# Patient Record
Sex: FEMALE | Race: WHITE | Employment: FULL TIME | URBAN - METROPOLITAN AREA
[De-identification: names, ages, dates, MRNs, and addresses within clinical notes are randomized per-mention and may not be internally consistent; named-entity substitution may affect disease eponyms.]

---

## 2017-04-10 ENCOUNTER — TRANSFERRED RECORDS (OUTPATIENT)
Dept: HEALTH INFORMATION MANAGEMENT | Facility: CLINIC | Age: 22
End: 2017-04-10

## 2017-04-10 LAB — EJECTION FRACTION: 81 %

## 2018-06-11 ENCOUNTER — TRANSFERRED RECORDS (OUTPATIENT)
Dept: HEALTH INFORMATION MANAGEMENT | Facility: CLINIC | Age: 23
End: 2018-06-11

## 2018-07-05 ENCOUNTER — TELEPHONE (OUTPATIENT)
Dept: TRANSPLANT | Facility: CLINIC | Age: 23
End: 2018-07-05

## 2018-07-05 NOTE — TELEPHONE ENCOUNTER
"[employer] BREEZE  [id number]      LIVING KIDNEY DONOR EVALUATION  Donor First Name Kinjal Donor MRMIRNA    Donor Last Name Crain Completed 2018 8:47 PM    1995 Record ID [id number]   BREEZE Screen PASSED     Intended Recipient  Recipient First Name April Recipient MRN    Recipient Last Name Salvatore Relationship Met through social media   Recipient  1993 Recipient Diagnosis    Recipient's ABO      Donor Information  Age 22 Gender Female   Ht 165 cm (5' 5'') Race    Wt 73.9 kg (163 lbs) Ethnicity Not /   BMI 27.20 kg/m  Preferred Language English      Required No     Blood Type O   Demographics  Home Address [facility] [address] [phone]   [facility] Type [facility] Alternate #    [facility] Code [address] Type    Country [place] Preferred Contact day Mon, Fri, Thur, Wed, Tue   Email [email or web address] Preferred Contact time 11:00 AM-1:00 PM, 1:00 PM-4:00 PM, 09:00 AM-11:00 AM   Donor's Medical Information  Medical History \"Hysterectomy \"   Allergic Rhinitis   Asthma ( no Hospitalizations, no Intubations, no Steroid Use )   Dysfunctional Uterine Bleeding   GERD   Migraine Headaches   Ovarian Cysts Medications Omeprazole   Topamax   Zyrtec   Surgical History Ovarian Cystectomy   Vaginal Hysterectomy Allergies Indomethacin Extended Release : other (Dizzy )   LATEX : Rash   Penicillin : Wheezing and/or Bronchospasm, Rash   Vicodin : Nausea and/or Vomiting   Social History EtOH: Rare (1-2 drinks/year)   Illicit Drug Use: Denies   Tobacco: Denies Self-Reported Functional Status \"I am able to participate in strenuous sports such as swimming, singles tennis, football, basketball, or skiing\"   Family Medical History Cancer (denies)   Diabetes (denies)   Heart Disease (denies)   Hypertension (denies)   Kidney Disease (denies)   Kidney Stones (denies) Exercise Frequency Exercise (Not on a regular basis)   Review of Organ Systems  Review of Systems " Airway or Lungs: Yes   Blood Disorder: No   Cancer: No   Diabetes,Thyroid,Adrenal,Endocrine Disorder: No   Digestive or Liver: Yes   Female Health: Yes   Heart or Circulatory System: No   Immune Diseases: No   Kidneys and Bladder: No   Muscles,Bones,Joints: No   Neuro: No   Psych: No   Donor's Social Information  Marital Status Domestic Partnership Living Accommodation Lives in rented accommodation   Level of Education Some college education Living Arrangement With spouse   Employment Status Full Time Concerns: health and life insurance No   Employer [employer] Concerns: job security and lost income No   Occupation      Medical Insurance Status Has medical insurance     High Risk Behavior  High Risk Behaviors Blood transfusion < 12 months. (NO)   Commercial sex < 12 months. (NO)   Illicit IV drug use < 5yrs. (NO)   Other high risk sexual contact < 12 months. (NO)   Reason for Donation  Referral Self Researched Reason for Donation I want to be able to give back to people with a deserving chance. I ve seen a out of people in my family hurt with illness and I wasn t able to help. And this is a way for me to give back to a family in need.   Permission to Disclose Inquiry Yes Patient Comments    Donor Motivation Level Highly motivated donor     PCP Contact  PCP Name Enrrique Humphries   PCP [facility]   PCP [facility]   PCP Phone [phone]   Emergency Contact  First Name Rama First Name Leida   Last Name Filipe Last Name Armando   Phone # [phone] Phone # [phone]   Phone Type Mobile Phone Type Mobile   Relationship Spouse Relationship Mother   Office Use  Reviewed By    Reviewed 7/2/2018 8:40 AM   Admin Folder Accept   Comments 7/2 - Passed Eval   Lost for Followup    Extended Comments    BREEZE ID [email or web address]:[id number] survey status completed   Activity History  Call  Task    Due Date 7/3/2018   Last Modified Date/Time 7/3/2018 9:21 AM   Comments    Call  Task     Due Date 7/2/2018   Last Modified Date/Time 7/2/2018 10:19 AM   Comments

## 2018-07-06 DIAGNOSIS — Z00.5 TRANSPLANT DONOR EVALUATION: Primary | ICD-10-CM

## 2018-07-11 ENCOUNTER — DOCUMENTATION ONLY (OUTPATIENT)
Dept: TRANSPLANT | Facility: CLINIC | Age: 23
End: 2018-07-11

## 2018-07-12 ENCOUNTER — TELEPHONE (OUTPATIENT)
Dept: TRANSPLANT | Facility: CLINIC | Age: 23
End: 2018-07-12

## 2018-07-12 NOTE — TELEPHONE ENCOUNTER
Informed Kinjal that her Phase 1's are OK. Average VVJ=570. Verified interest in direct or PEP donation if needed. Had Hyst 6/11/18 for Endometriosis. Has check up with her Dr 7/31.

## 2018-07-13 ENCOUNTER — TELEPHONE (OUTPATIENT)
Dept: TRANSPLANT | Facility: CLINIC | Age: 23
End: 2018-07-13

## 2018-07-13 DIAGNOSIS — Z00.5 TRANSPLANT DONOR EVALUATION: ICD-10-CM

## 2018-07-17 ENCOUNTER — TELEPHONE (OUTPATIENT)
Dept: TRANSPLANT | Facility: CLINIC | Age: 23
End: 2018-07-17

## 2018-07-17 NOTE — TELEPHONE ENCOUNTER
Phone conversation with Kinjal today.  She will be coming to [facility] for a donor evaluation this Friday.  She is requesting travel assistance.  There isn't enough time to apply for NLDAC and she does not want to hold off on coming here for the evaluation.  I explained to her that we could utilize our hospital's rajesh to assist with hotel expenses and did complete rajesh request and reservations were made.  I sent her the following email:    Thanks for taking time to talk with me this morning.  I did have our accommodations department make reservations for Thursday night, 07/19/2012.  They were able to make reservations at [facility],    [facility] [id number].  This is a different hotel that we talked about, but the cost is significantly less and they have a shuttle service that will bring you to the clinic.  You will need to talk with them about how to set it up when you arrive!!  [employer] [address] ([place])  View Map Reservations: [phone]  Safe travels and I will see you when you are here on Friday for the donor evaluation!!    I:  Arrangements made to assist with hotel expenses.  We will need to consider NLDAC if she is approved and coming back for surgery.  Not enough time now to apply for the rajesh but should be considered for any future travel expenses.  P:  I will meet with donor in clinic of Friday to complete donor psychosocial evaluation.  Will remain available to assist with any further psychosocial or JERMAIN needs.

## 2018-07-20 ENCOUNTER — APPOINTMENT (OUTPATIENT)
Dept: TRANSPLANT | Facility: CLINIC | Age: 23
End: 2018-07-20
Attending: TRANSPLANT SURGERY

## 2018-07-20 ENCOUNTER — ALLIED HEALTH/NURSE VISIT (OUTPATIENT)
Dept: TRANSPLANT | Facility: CLINIC | Age: 23
End: 2018-07-20
Attending: TRANSPLANT SURGERY

## 2018-07-20 ENCOUNTER — OFFICE VISIT (OUTPATIENT)
Dept: TRANSPLANT | Facility: CLINIC | Age: 23
End: 2018-07-20
Attending: TRANSPLANT SURGERY

## 2018-07-20 ENCOUNTER — INFUSION THERAPY VISIT (OUTPATIENT)
Dept: INFUSION THERAPY | Facility: CLINIC | Age: 23
End: 2018-07-20
Attending: INTERNAL MEDICINE

## 2018-07-20 ENCOUNTER — OFFICE VISIT (OUTPATIENT)
Dept: NEPHROLOGY | Facility: CLINIC | Age: 23
End: 2018-07-20
Attending: INTERNAL MEDICINE

## 2018-07-20 ENCOUNTER — RADIANT APPOINTMENT (OUTPATIENT)
Dept: GENERAL RADIOLOGY | Facility: CLINIC | Age: 23
End: 2018-07-20
Attending: INTERNAL MEDICINE
Payer: COMMERCIAL

## 2018-07-20 ENCOUNTER — RADIANT APPOINTMENT (OUTPATIENT)
Dept: CT IMAGING | Facility: CLINIC | Age: 23
End: 2018-07-20
Attending: INTERNAL MEDICINE
Payer: COMMERCIAL

## 2018-07-20 ENCOUNTER — APPOINTMENT (OUTPATIENT)
Dept: GENERAL RADIOLOGY | Facility: CLINIC | Age: 23
End: 2018-07-20
Payer: COMMERCIAL

## 2018-07-20 VITALS
HEIGHT: 65 IN | RESPIRATION RATE: 18 BRPM | HEART RATE: 100 BPM | DIASTOLIC BLOOD PRESSURE: 85 MMHG | BODY MASS INDEX: 29.16 KG/M2 | SYSTOLIC BLOOD PRESSURE: 128 MMHG | WEIGHT: 175.04 LBS | OXYGEN SATURATION: 100 % | TEMPERATURE: 97.8 F

## 2018-07-20 VITALS — BODY MASS INDEX: 29.17 KG/M2 | WEIGHT: 175.1 LBS | HEIGHT: 65 IN

## 2018-07-20 DIAGNOSIS — Z00.5 TRANSPLANT DONOR EVALUATION: ICD-10-CM

## 2018-07-20 DIAGNOSIS — Z00.5 TRANSPLANT DONOR EVALUATION: Primary | ICD-10-CM

## 2018-07-20 LAB
ABO + RH BLD: NORMAL
ABO + RH BLD: NORMAL
ALBUMIN SERPL-MCNC: 4.1 G/DL (ref 3.4–5)
ALBUMIN UR-MCNC: NEGATIVE MG/DL
ALP SERPL-CCNC: 164 U/L (ref 40–150)
ALT SERPL W P-5'-P-CCNC: 41 U/L (ref 0–50)
ANION GAP SERPL CALCULATED.3IONS-SCNC: 9 MMOL/L (ref 3–14)
APPEARANCE UR: ABNORMAL
APTT PPP: 27 SEC (ref 22–37)
AST SERPL W P-5'-P-CCNC: 25 U/L (ref 0–45)
BACTERIA #/AREA URNS HPF: ABNORMAL /HPF
BILIRUB SERPL-MCNC: 0.2 MG/DL (ref 0.2–1.3)
BILIRUB UR QL STRIP: NEGATIVE
BLD GP AB SCN SERPL QL: NORMAL
BLOOD BANK CMNT PATIENT-IMP: NORMAL
BUN SERPL-MCNC: 17 MG/DL (ref 7–30)
CALCIUM SERPL-MCNC: 8.8 MG/DL (ref 8.5–10.1)
CHLORIDE SERPL-SCNC: 107 MMOL/L (ref 94–109)
CHOLEST SERPL-MCNC: 143 MG/DL
CMV IGG SERPL QL IA: >8 AI (ref 0–0.8)
CO2 SERPL-SCNC: 23 MMOL/L (ref 20–32)
COLOR UR AUTO: YELLOW
CREAT SERPL-MCNC: 0.75 MG/DL (ref 0.52–1.04)
CREAT UR-MCNC: 130 MG/DL
EBV VCA IGG SER QL IA: 7.9 AI (ref 0–0.8)
EBV VCA IGM SER QL IA: 0.8 AI (ref 0–0.8)
ERYTHROCYTE [DISTWIDTH] IN BLOOD BY AUTOMATED COUNT: 11.9 % (ref 10–15)
GFR SERPL CREATININE-BSD FRML MDRD: >90 ML/MIN/1.7M2
GLUCOSE SERPL-MCNC: 98 MG/DL (ref 70–99)
GLUCOSE UR STRIP-MCNC: NEGATIVE MG/DL
HBA1C MFR BLD: 5.4 % (ref 0–5.6)
HBV CORE AB SERPL QL IA: NONREACTIVE
HBV SURFACE AB SERPL IA-ACNC: 0 M[IU]/ML
HBV SURFACE AG SERPL QL IA: NONREACTIVE
HCT VFR BLD AUTO: 41.3 % (ref 35–47)
HCV AB SERPL QL IA: NONREACTIVE
HDLC SERPL-MCNC: 28 MG/DL
HGB BLD-MCNC: 14 G/DL (ref 11.7–15.7)
HGB UR QL STRIP: NEGATIVE
HIV 1+2 AB+HIV1 P24 AG SERPL QL IA: NONREACTIVE
INR PPP: 0.98 (ref 0.86–1.14)
KETONES UR STRIP-MCNC: NEGATIVE MG/DL
LDLC SERPL CALC-MCNC: 46 MG/DL
LEUKOCYTE ESTERASE UR QL STRIP: ABNORMAL
MCH RBC QN AUTO: 28.7 PG (ref 26.5–33)
MCHC RBC AUTO-ENTMCNC: 33.9 G/DL (ref 31.5–36.5)
MCV RBC AUTO: 85 FL (ref 78–100)
MICROALBUMIN UR-MCNC: 9 MG/L
MICROALBUMIN/CREAT UR: 7.07 MG/G CR (ref 0–25)
MUCOUS THREADS #/AREA URNS LPF: PRESENT /LPF
NITRATE UR QL: NEGATIVE
NONHDLC SERPL-MCNC: 115 MG/DL
PH UR STRIP: 5 PH (ref 5–7)
PHOSPHATE SERPL-MCNC: 4.4 MG/DL (ref 2.5–4.5)
PLATELET # BLD AUTO: 344 10E9/L (ref 150–450)
POTASSIUM SERPL-SCNC: 3.8 MMOL/L (ref 3.4–5.3)
PROT SERPL-MCNC: 7.6 G/DL (ref 6.8–8.8)
PROT UR-MCNC: 0.14 G/L
PROT/CREAT 24H UR: 0.11 G/G CR (ref 0–0.2)
RBC # BLD AUTO: 4.88 10E12/L (ref 3.8–5.2)
RBC #/AREA URNS AUTO: 2 /HPF (ref 0–2)
SODIUM SERPL-SCNC: 138 MMOL/L (ref 133–144)
SOURCE: ABNORMAL
SP GR UR STRIP: 1.02 (ref 1–1.03)
SPECIMEN EXP DATE BLD: NORMAL
SQUAMOUS #/AREA URNS AUTO: 11 /HPF (ref 0–1)
T PALLIDUM AB SER QL: NONREACTIVE
TRIGL SERPL-MCNC: 345 MG/DL
URATE SERPL-MCNC: 6.7 MG/DL (ref 2.6–6)
UROBILINOGEN UR STRIP-MCNC: 0 MG/DL (ref 0–2)
WBC # BLD AUTO: 9.5 10E9/L (ref 4–11)
WBC #/AREA URNS AUTO: 6 /HPF (ref 0–5)

## 2018-07-20 PROCEDURE — 86850 RBC ANTIBODY SCREEN: CPT | Performed by: INTERNAL MEDICINE

## 2018-07-20 PROCEDURE — 84550 ASSAY OF BLOOD/URIC ACID: CPT | Performed by: INTERNAL MEDICINE

## 2018-07-20 PROCEDURE — 86900 BLOOD TYPING SEROLOGIC ABO: CPT | Performed by: INTERNAL MEDICINE

## 2018-07-20 PROCEDURE — 80053 COMPREHEN METABOLIC PANEL: CPT | Performed by: INTERNAL MEDICINE

## 2018-07-20 PROCEDURE — 84100 ASSAY OF PHOSPHORUS: CPT | Performed by: INTERNAL MEDICINE

## 2018-07-20 PROCEDURE — 85730 THROMBOPLASTIN TIME PARTIAL: CPT | Performed by: INTERNAL MEDICINE

## 2018-07-20 PROCEDURE — 40000866 ZZHCL STATISTIC HIV 1/2 ANTIGEN/ANTIBODY PRETRANSPLANT ONLY: Performed by: INTERNAL MEDICINE

## 2018-07-20 PROCEDURE — 86644 CMV ANTIBODY: CPT | Performed by: INTERNAL MEDICINE

## 2018-07-20 PROCEDURE — 86780 TREPONEMA PALLIDUM: CPT | Performed by: INTERNAL MEDICINE

## 2018-07-20 PROCEDURE — 85610 PROTHROMBIN TIME: CPT | Performed by: INTERNAL MEDICINE

## 2018-07-20 PROCEDURE — 80061 LIPID PANEL: CPT | Performed by: INTERNAL MEDICINE

## 2018-07-20 PROCEDURE — 86706 HEP B SURFACE ANTIBODY: CPT | Performed by: INTERNAL MEDICINE

## 2018-07-20 PROCEDURE — 25000128 H RX IP 250 OP 636: Mod: ZF | Performed by: INTERNAL MEDICINE

## 2018-07-20 PROCEDURE — 86480 TB TEST CELL IMMUN MEASURE: CPT | Performed by: INTERNAL MEDICINE

## 2018-07-20 PROCEDURE — 86665 EPSTEIN-BARR CAPSID VCA: CPT | Performed by: INTERNAL MEDICINE

## 2018-07-20 PROCEDURE — 86704 HEP B CORE ANTIBODY TOTAL: CPT | Performed by: INTERNAL MEDICINE

## 2018-07-20 PROCEDURE — 82542 COL CHROMOTOGRAPHY QUAL/QUAN: CPT | Performed by: INTERNAL MEDICINE

## 2018-07-20 PROCEDURE — 81001 URINALYSIS AUTO W/SCOPE: CPT | Performed by: TRANSPLANT SURGERY

## 2018-07-20 PROCEDURE — 84156 ASSAY OF PROTEIN URINE: CPT | Performed by: TRANSPLANT SURGERY

## 2018-07-20 PROCEDURE — 82043 UR ALBUMIN QUANTITATIVE: CPT | Performed by: TRANSPLANT SURGERY

## 2018-07-20 PROCEDURE — 86901 BLOOD TYPING SEROLOGIC RH(D): CPT | Performed by: INTERNAL MEDICINE

## 2018-07-20 PROCEDURE — 85027 COMPLETE CBC AUTOMATED: CPT | Performed by: INTERNAL MEDICINE

## 2018-07-20 PROCEDURE — 86803 HEPATITIS C AB TEST: CPT | Performed by: INTERNAL MEDICINE

## 2018-07-20 PROCEDURE — 87340 HEPATITIS B SURFACE AG IA: CPT | Performed by: INTERNAL MEDICINE

## 2018-07-20 PROCEDURE — 83036 HEMOGLOBIN GLYCOSYLATED A1C: CPT | Performed by: INTERNAL MEDICINE

## 2018-07-20 RX ORDER — TOPIRAMATE 50 MG/1
25 TABLET, FILM COATED ORAL 2 TIMES DAILY
Status: ON HOLD | COMMUNITY
End: 2019-05-22

## 2018-07-20 RX ORDER — CETIRIZINE HYDROCHLORIDE 10 MG/1
10 TABLET ORAL DAILY
COMMUNITY

## 2018-07-20 RX ORDER — IOPAMIDOL 755 MG/ML
100 INJECTION, SOLUTION INTRAVASCULAR ONCE
Status: COMPLETED | OUTPATIENT
Start: 2018-07-20 | End: 2018-07-20

## 2018-07-20 RX ADMIN — IOPAMIDOL 100 ML: 755 INJECTION, SOLUTION INTRAVASCULAR at 13:03

## 2018-07-20 RX ADMIN — IOHEXOL 5 ML: 300 INJECTION, SOLUTION INTRAVENOUS at 08:11

## 2018-07-20 ASSESSMENT — PAIN SCALES - GENERAL: PAINLEVEL: NO PAIN (0)

## 2018-07-20 NOTE — PROGRESS NOTES
Donor Iohexol test    Kinjal Crain presents today to [facility] for a Donor Iohexol test.      Progress note:  ID verified by name and .     The following information was verified with the patient:  Female Patients is there any possibility of being pregnant No  Is there a history of allergy (skin rash, swelling, ect) to:   A.  Iodine (except skin reactions to betadine): No   B. Intravenous radio-contrast agents: No   C. Seafood No    R.N. provided patient with educational handout regarding timed test. Yes    20 gauge PIV placed in Left AC.  Iohexol administered.  Following iohexol administration extension set replaced.  PIV left in place for blood draws and CT this afternoon    Medication administered:  Iohexol (Omnipaque 300mg iodine/ml concentration) 5 mls.    Start time: 815  Stop time: 817    Drug Waste Record    Drug Name: Iohexol  Dose: 5ml  Route administered: IV  NDC #: 0407-1413-10  Amount of waste(mL):5  Reason for waste: Single use vial    Administrations This Visit     iohexol (OMNIPAQUE 300) injection 5 mL     Admin Date Action Dose Route Administered By             2018 Given 5 mL Intravenous Dayna Monteiro RN                              Evaluation nurse in transplant to draw labs at 2 and 4 hours post iohexol administration.  Patient given a slip with the times to get labs drawn and verbalized understanding of the plan.    Patient tolerated the procedure:  Yes    After the infusion patient was discharged to the next appointment.    Dayna Monteiro RN

## 2018-07-20 NOTE — MR AVS SNAPSHOT
After Visit Summary   7/20/2018    Kinjal Crain    MRN: [id number]           Patient Information     Date Of Birth          1995        Visit Information        Provider Department      7/20/2018 10:15 AM Leida Brooks, CYNDY [facility] Solid Organ Transplant        Today's Diagnoses     Transplant donor evaluation    -  1       Follow-ups after your visit        Who to contact     If you have questions or need follow up information about today's clinic visit or your schedule please contact [facility] SOLID ORGAN TRANSPLANT directly at [phone].  Normal or non-critical lab and imaging results will be communicated to you by MeeDochart, letter or phone within 4 business days after the clinic has received the results. If you do not hear from us within 7 days, please contact the clinic through AudioPixelst or phone. If you have a critical or abnormal lab result, we will notify you by phone as soon as possible.  Submit refill requests through Clearwire or call your pharmacy and they will forward the refill request to us. Please allow 3 business days for your refill to be completed.          Additional Information About Your Visit        MyChart Information     Clearwire gives you secure access to your electronic health record. If you see a primary care provider, you can also send messages to your care team and make appointments. If you have questions, please call your primary care clinic.  If you do not have a primary care provider, please call [phone] and they will assist you.        Care EveryWhere ID     This is your Care EveryWhere ID. This could be used by other organizations to access your [facility] medical records  [id number]         Blood Pressure from Last 3 Encounters:   No data found for BP    Weight from Last 3 Encounters:   No data found for Wt              Today, you had the following     No orders found for display       Primary Care Provider Fax #    Physician No Ref-Primary [phone]        No address on file        Equal Access to Services     BILLY JG : Hadii aad shaunna kendall Acuña, wadianeda luqkyler, qaybta kaneydapurvi olivia, waxmedhat aron rahmanafricatony clark. So [facility] [phone].    ATENCIÓN: Si habla español, tiene a medina disposición servicios gratuitos de asistencia lingüística. Llame al [phone].    We comply with applicable federal civil rights laws and [place] laws. We do not discriminate on the basis of race, color, national origin, age, disability, sex, sexual orientation, or gender identity.            Thank you!     Thank you for choosing [facility] SOLID ORGAN TRANSPLANT  for your care. Our goal is always to provide you with excellent care. Hearing back from our patients is one way we can continue to improve our services. Please take a few minutes to complete the written survey that you may receive in the mail after your visit with us. Thank you!             Your Updated Medication List - Protect others around you: Learn how to safely use, store and throw away your medicines at www.disposemymeds.org.          This list is accurate as of 7/20/18 11:59 PM.  Always use your most recent med list.                   Brand Name Dispense Instructions for use Diagnosis    cetirizine 10 MG tablet    zyrTEC     Take 10 mg by mouth daily        omeprazole 20 MG CR capsule    priLOSEC     Take 20 mg by mouth daily        topiramate 50 MG tablet    TOPAMAX     Take 25 mg by mouth 2 times daily

## 2018-07-20 NOTE — MR AVS SNAPSHOT
After Visit Summary   7/20/2018    Kinjal Crain    MRN: [id number]           Patient Information     Date Of Birth          1995        Visit Information        Provider Department      7/20/2018 9:45 AM Carmen Vigil RD [facility] Solid Organ Transplant        Today's Diagnoses     Transplant donor evaluation    -  1       Follow-ups after your visit        Your next 10 appointments already scheduled     Jul 20, 2018  2:00 PM CDT   CTA RENAL WITH CONTRAST with UCCT2   [facility] CT ([facility])    [address] [address] [phone]           Please bring any scans or X-rays taken at other hospitals, if similar tests were done. Also bring a list of your medicines, including vitamins, minerals and over-the-counter drugs. It is safest to leave personal items at home.  Be sure to tell your doctor:   If you have any allergies.   If there s any chance you are pregnant.   If you are breastfeeding.    If you have diabetes as your medication may need to be adjusted for this exam.  You will have contrast for this exam. To prepare:   Do not eat or drink for 2 hours before your exam. If you need to take medicine, you may take it with small sips of water. (We may ask you to take liquid medicine as well.)   The day before your exam, drink extra fluids at least six 8-ounce glasses (unless your doctor tells you to restrict your fluids).  Patients over 70 or patients with diabetes or kidney problems:   If you haven t had a blood test (creatinine test) within the last 30 days, the Cardiologist/Radiologist may require you to get this test prior to your exam.  Please wear loose clothing, such as a sweat suit or jogging clothes. Avoid snaps, zippers and other metal. We may ask you to undress and put on a hospital gown.  If you have any questions, please call the Imaging Department where you will have your exam.              Who to  "contact     If you have questions or need follow up information about today's clinic visit or your schedule please contact [facility] SOLID ORGAN TRANSPLANT directly at [phone].  Normal or non-critical lab and imaging results will be communicated to you by MyChart, letter or phone within 4 business days after the clinic has received the results. If you do not hear from us within 7 days, please contact the clinic through ClickingHousehart or phone. If you have a critical or abnormal lab result, we will notify you by phone as soon as possible.  Submit refill requests through TechDevils or call your pharmacy and they will forward the refill request to us. Please allow 3 business days for your refill to be completed.          Additional Information About Your Visit        TechDevils Information     TechDevils lets you send messages to your doctor, view your test results, renew your prescriptions, schedule appointments and more. To sign up, go to [email or web address] . Click on \"Log in\" on the left side of the screen, which will take you to the Welcome page. Then click on \"Sign up Now\" on the right side of the page.     You will be asked to enter the access code listed below, as well as some personal information. Please follow the directions to create your username and password.     Your access code is: [email or web address]  Expires: 10/14/2018  6:30 AM     Your access code will  in 90 days. If you need help or a new code, please call your [facility] clinic or [phone].        Care EveryWhere ID     This is your Care EveryWhere ID. This could be used by other organizations to access your [facility] medical records  [id number]         Blood Pressure from Last 3 Encounters:   18 128/85    Weight from Last 3 Encounters:   18 79.4 kg (175 lb 0.7 oz)   18 79.4 kg (175 lb 1.6 oz)              Today, you had the following     No orders found for display       Primary Care Provider Fax #    Physician No Ref-Primary " [phone]       No address on file        Equal Access to Services     SHAY JG : Hadii raphael maza kendall Duncanali, sandiepurvi blairdaiha, dinesh alejaneydapurvi lockewaynepurvi, waxmedhat aron kathisidoro rahmanafricatony clark. So [facility] [phone].    ATENCIÓN: Si habla español, tiene a medina disposición servicios gratuitos de asistencia lingüística. Llame al [phone].    We comply with applicable federal civil rights laws and [place] laws. We do not discriminate on the basis of race, color, national origin, age, disability, sex, sexual orientation, or gender identity.            Thank you!     Thank you for choosing [facility] SOLID ORGAN TRANSPLANT  for your care. Our goal is always to provide you with excellent care. Hearing back from our patients is one way we can continue to improve our services. Please take a few minutes to complete the written survey that you may receive in the mail after your visit with us. Thank you!             Your Updated Medication List - Protect others around you: Learn how to safely use, store and throw away your medicines at www.disposemymeds.org.          This list is accurate as of 7/20/18  1:47 PM.  Always use your most recent med list.                   Brand Name Dispense Instructions for use Diagnosis    cetirizine 10 MG tablet    zyrTEC     Take 10 mg by mouth daily        omeprazole 20 MG CR capsule    priLOSEC     Take 20 mg by mouth daily        topiramate 50 MG tablet    TOPAMAX     Take 25 mg by mouth 2 times daily

## 2018-07-20 NOTE — MR AVS SNAPSHOT
After Visit Summary   7/20/2018    Kinjal Crain    MRN: [id number]           Patient Information     Date Of Birth          1995        Visit Information        Provider Department      7/20/2018 8:00 AM UC 49 ATC; UC SPEC INFUSION [facility] [place] Specialty and Procedure        Today's Diagnoses     Transplant donor evaluation    -  1       Follow-ups after your visit        Your next 10 appointments already scheduled     Jul 20, 2018  2:00 PM CDT   CTA RENAL WITH CONTRAST with UCCT2   [facility] [place] [facility] ([facility])    [address] [address] [phone]           Please bring any scans or X-rays taken at other hospitals, if similar tests were done. Also bring a list of your medicines, including vitamins, minerals and over-the-counter drugs. It is safest to leave personal items at home.  Be sure to tell your doctor:   If you have any allergies.   If there s any chance you are pregnant.   If you are breastfeeding.    If you have diabetes as your medication may need to be adjusted for this exam.  You will have contrast for this exam. To prepare:   Do not eat or drink for 2 hours before your exam. If you need to take medicine, you may take it with small sips of water. (We may ask you to take liquid medicine as well.)   The day before your exam, drink extra fluids at least six 8-ounce glasses (unless your doctor tells you to restrict your fluids).  Patients over 70 or patients with diabetes or kidney problems:   If you haven t had a blood test (creatinine test) within the last 30 days, the Cardiologist/Radiologist may require you to get this test prior to your exam.  Please wear loose clothing, such as a sweat suit or jogging clothes. Avoid snaps, zippers and other metal. We may ask you to undress and put on a hospital gown.  If you have any questions, please call the Imaging Department where you will have  "your exam.              Who to contact     If you have questions or need follow up information about today's clinic visit or your schedule please contact [facility] SPECIALTY AND PROCEDURE directly at [phone].  Normal or non-critical lab and imaging results will be communicated to you by MyChart, letter or phone within 4 business days after the clinic has received the results. If you do not hear from us within 7 days, please contact the clinic through MyChart or phone. If you have a critical or abnormal lab result, we will notify you by phone as soon as possible.  Submit refill requests through AzulStar or call your pharmacy and they will forward the refill request to us. Please allow 3 business days for your refill to be completed.          Additional Information About Your Visit        [facility] Center[facility] Media Information     AzulStar lets you send messages to your doctor, view your test results, renew your prescriptions, schedule appointments and more. To sign up, go to [email or web address] . Click on \"Log in\" on the left side of the screen, which will take you to the Welcome page. Then click on \"Sign up Now\" on the right side of the page.     You will be asked to enter the access code listed below, as well as some personal information. Please follow the directions to create your username and password.     Your access code is: [email or web address]  Expires: 10/14/2018  6:30 AM     Your access code will  in 90 days. If you need help or a new code, please call your [facility] clinic or [phone].        Care EveryWhere ID     This is your Care EveryWhere ID. This could be used by other organizations to access your [facility] medical records  [id number]        Your Vitals Were     Height BMI (Body Mass Index)                1.651 m (5' 5\") 29.14 kg/m2           Blood Pressure from Last 3 Encounters:   18 128/85    Weight from Last 3 Encounters:   18 79.4 kg (175 lb 0.7 oz)   18 79.4 kg " (175 lb 1.6 oz)              Today, you had the following     No orders found for display       Primary Care Provider Fax #    Physician No Ref-Primary [phone]       No address on file        Equal Access to Services     SHAY JG : Suzy raphael maza kendall Acuña, sandieda lumay, gibsonta kamatthew olivia, luann hi lafosterisidoro clark. So [facility] [phone].    ATENCIÓN: Si habla español, tiene a medina disposición servicios gratuitos de asistencia lingüística. Llame al [phone].    We comply with applicable federal civil rights laws and [place] laws. We do not discriminate on the basis of race, color, national origin, age, disability, sex, sexual orientation, or gender identity.            Thank you!     Thank you for choosing [facility] SPECIALTY AND PROCEDURE  for your care. Our goal is always to provide you with excellent care. Hearing back from our patients is one way we can continue to improve our services. Please take a few minutes to complete the written survey that you may receive in the mail after your visit with us. Thank you!             Your Updated Medication List - Protect others around you: Learn how to safely use, store and throw away your medicines at www.disposemymeds.org.          This list is accurate as of 7/20/18  1:55 PM.  Always use your most recent med list.                   Brand Name Dispense Instructions for use Diagnosis    cetirizine 10 MG tablet    zyrTEC     Take 10 mg by mouth daily        omeprazole 20 MG CR capsule    priLOSEC     Take 20 mg by mouth daily        topiramate 50 MG tablet    TOPAMAX     Take 25 mg by mouth 2 times daily

## 2018-07-20 NOTE — PROGRESS NOTES
RE: Kinjal Crain  [facility] #[id number]           I saw your patient, Kinjal Crain, in consultation in our pretransplant clinic. She was here at clinic for evaluation as a possible kidney donor to someone she heard about via social media. She presented to clinic today with her significant other.  She had seen our donor video.  Our donor coordinator shared our center-specific results with her.  We discussed:    (1) The risks of donation--including mortality (0.03% risk) and morbidity (approximately 1% risk of major morbidity).  We discussed the major reported causes of death after kidney donation (bleeding, infection, pulmonary embolism).  We discussed the potential complications, including:  bleeding requiring reoperation, infection requiring reoperation, pneumonia, bowel obstruction, infection at the site of the incision, hernia at the site of the incision, deep vein thrombosis, deep vein thrombosis with associated pulmonary embolism, and urinary tract infection.  I told her I could not possibly name all of the risks, but that she was at risk for any complications that could occur in any operation (and that a local library would have books/resources that could provide more detail).       (2) We also discussed the long-term risks of living with one kidney.  We talked in detail about the new publications suggesting slightly increased long-term risk of ESRD after donor nephrectomy.  We discussed the fact that most of the ESRD that has developed after donation has occurred in those donating to a relative; and that it is known that individuals who have a relative with ESRD are at increased risk of ESRD.  For people her age, there is really no data to show what the outcome will be at 50-60 years.  This is an important consideration.  We discussed the rare diseases that might affect having only one kidney.    (3) The hospital stay and the fact that if all goes well, discharge would occur within two to three days after  donor nephrectomy.  We also discussed the fact that there would be no diet or fluid restrictions (again if all went well), and that the only new medication that she would be on would be pain medication.  We discussed the fact that most patients are on Tylenol or something similar within five to six days of surgery.      (4) The recovery time after surgery and the restrictions that are necessary:  a) no driving for a couple of weeks (or until she felt that her reaction would be adequate if she had to slam on the brakes; and b) no lifting over 10 pounds or any exercise that would stretch her abdominal muscles for six weeks (to allow the muscles to heal so that she doesn't develop a hernia).  We also discussed return to work, which could occur in approximately three to four weeks if she had a desk job or would require not returning to work for at least six weeks if the job required any heavy lifting or exercise.  We discussed the potential for not getting her pep and energy back for anywhere from two to six weeks after surgery and how there was a tremendous individual variation in return of full energy level.  I discussed our donor follow-up studies; and that in our surveys, 90% of donors had their energy back by 6 weeks postdonation.    (5) The concern about long distance travel for the first couple of weeks post-donation.  We discussed the risks of deep vein thrombosis associated with plane or car travel.  I recommended that, if flying, she should get up and walk around every 30-40 minutes; if driving she should ask the  to stop the car every 30-45 minutes so Ms. Crain could take a short walk.    (6) The fact that the recipient could be on a waiting list for  donor transplant and would ultimately receive a kidney if Ms. Crain did not donate.      Finally, they were concerned that the potential recipient might have a disease that would recur and destroy the transplant. Our donor coordinator was  able to look at the cause of recipient ESRD; it is not a potentially recurrent disease.    I attempted to answer any remaining questions.  I also told her that should she have any questions, she should feel free to contact us.  We would be glad to answer any questions either over the phone or at another clinic visit.  Her transplant coordinator is Jazmin Madrid and may be reached at [phone].  Thank you for the opportunity to see her.    I spent 40 minutes with this patient.  Over 95% that time was spent in counseling and coordination of care.             Yours truly,               Geovany Russell MD         Professor of Surgery         ([phone])      NICO/

## 2018-07-20 NOTE — MR AVS SNAPSHOT
After Visit Summary   7/20/2018    Kinjal Crain    MRN: [id number]           Patient Information     Date Of Birth          1995        Visit Information        Provider Department      7/20/2018 12:15 PM Jazmin Madrid, RN [facility] Solid Organ Transplant        Today's Diagnoses     Transplant donor evaluation    -  1       Follow-ups after your visit        Your next 10 appointments already scheduled     Jul 20, 2018 11:20 AM CDT   (Arrive by 10:50 AM)   Kidney Donor Evaluation with  Kidney Donor Rachel   [facility] Nephrology ([facility])    [address] [place] [address]  [address]  [facility] [address]   [phone]            Jul 20, 2018 12:15 PM CDT   (Arrive by 12:00 PM)   SOT CARE COORDINATOR RACHEL with Jazmin Madrid RN   [facility] Solid Organ Transplant ([facility])    [address] [place] [address]  [address]  [facility] [address]   [phone]            Jul 20, 2018  1:20 PM CDT   ecg with  CV EKG   [facility] Xray ([facility])    [address]  [address] [facility] [id number] [phone]            Jul 20, 2018  1:45 PM CDT   XR CHEST 2 VIEWS with UCXR1   [facility] Xray ([facility])    [address] [place] [address]  [address] [facility] [id number] [phone]           Please bring a list of your current medicines to your exam. (Include vitamins, minerals and over-thecounter medicines.) Leave your valuables at home.  Tell your doctor if there is a chance you may be pregnant.  You do not need to do anything special for this exam.            Jul 20, 2018  2:00 PM CDT   CTA RENAL WITH CONTRAST with UCCT2   [facility] CT ([facility])    [address]  1st Floor  [facility] [id number] [phone]           Please bring any scans or X-rays taken at other hospitals, if similar tests were  done. Also bring a list of your medicines, including vitamins, minerals and over-the-counter drugs. It is safest to leave personal items at home.  Be sure to tell your doctor:   If you have any allergies.   If there s any chance you are pregnant.   If you are breastfeeding.    If you have diabetes as your medication may need to be adjusted for this exam.  You will have contrast for this exam. To prepare:   Do not eat or drink for 2 hours before your exam. If you need to take medicine, you may take it with small sips of water. (We may ask you to take liquid medicine as well.)   The day before your exam, drink extra fluids at least six 8-ounce glasses (unless your doctor tells you to restrict your fluids).  Patients over 70 or patients with diabetes or kidney problems:   If you haven t had a blood test (creatinine test) within the last 30 days, the Cardiologist/Radiologist may require you to get this test prior to your exam.  Please wear loose clothing, such as a sweat suit or jogging clothes. Avoid snaps, zippers and other metal. We may ask you to undress and put on a hospital gown.  If you have any questions, please call the Imaging Department where you will have your exam.              Who to contact     If you have questions or need follow up information about today's clinic visit or your schedule please contact [facility] SOLID ORGAN TRANSPLANT directly at [phone].  Normal or non-critical lab and imaging results will be communicated to you by MyChart, letter or phone within 4 business days after the clinic has received the results. If you do not hear from us within 7 days, please contact the clinic through Inaikahart or phone. If you have a critical or abnormal lab result, we will notify you by phone as soon as possible.  Submit refill requests through Snocap or call your pharmacy and they will forward the refill request to us. Please allow 3 business days for your refill to be completed.          Additional  "Information About Your Visit        MyChart Information     [facility] lets you send messages to your doctor, view your test results, renew your prescriptions, schedule appointments and more. To sign up, go to [email or web address] . Click on \"Log in\" on the left side of the screen, which will take you to the Welcome page. Then click on \"Sign up Now\" on the right side of the page.     You will be asked to enter the access code listed below, as well as some personal information. Please follow the directions to create your username and password.     Your access code is: [email or web address]  Expires: 10/14/2018  6:30 AM     Your access code will  in 90 days. If you need help or a new code, please call your [place] clinic or [phone].        Care EveryWhere ID     This is your Care EveryWhere ID. This could be used by other organizations to access your [place] medical records  [id number]         Blood Pressure from Last 3 Encounters:   18 116/77    Weight from Last 3 Encounters:   18 79.4 kg (175 lb 0.7 oz)   18 79.4 kg (175 lb 1.6 oz)              Today, you had the following     No orders found for display       Primary Care Provider Fax #    Physician No Ref-Primary [phone]       No address on file        Equal Access to Services     [facility] : Hadii raphael claudioo Domenico, waaxda luqadaha, qaybta kaalmada ne, luann anderson . So [facility] [phone].    ATENCIÓN: Si habla español, tiene a medina disposición servicios gratuitos de asistencia lingüística. Llame al [phone].    We comply with applicable federal civil rights laws and [place] laws. We do not discriminate on the basis of race, color, national origin, age, disability, sex, sexual orientation, or gender identity.            Thank you!     Thank you for choosing [facility] SOLID ORGAN TRANSPLANT  for your care. Our goal is always to provide you with excellent care. Hearing back from our patients " is one way we can continue to improve our services. Please take a few minutes to complete the written survey that you may receive in the mail after your visit with us. Thank you!             Your Updated Medication List - Protect others around you: Learn how to safely use, store and throw away your medicines at www.disposemymeds.org.          This list is accurate as of 7/20/18 10:50 AM.  Always use your most recent med list.                   Brand Name Dispense Instructions for use Diagnosis    cetirizine 10 MG tablet    zyrTEC     Take 10 mg by mouth daily        omeprazole 20 MG CR capsule    priLOSEC     Take 20 mg by mouth daily        topiramate 50 MG tablet    TOPAMAX     Take 25 mg by mouth 2 times daily

## 2018-07-20 NOTE — NURSING NOTE
Saw Kinjal in clinic [facility] donor evaluation.    -Came with significant other, Clarice cuellar Has offered to be donor to friend  -Discussed surgery hospitalization and recovery.  Know when and how to obtain evaluation results and the process for scheduling surgery.  Reviewed SRTR datasheet.  Signed consent for donor evaluation.  Donor Signed MIREILLE.  Requested routine cancer screening tests:    PAP  Questions answered.  Approx. time spent:  30 minutes  Donor has medical insurance:  Yes      Living Kidney Donor Consent per OPTN Policy 14.3 for Transplant Coordinators    Written assurance has been obtained from the patient that the donor:   1) Is willing to donate  2) Is free from inducement and coercion  3) Has been informed that the donor may decline to donate at any time  4) Has been informed that transplant centers must:     A) Offer donors an opportunity to discontinue the donor consent or evaluation process in a way that is protected and confidential    B) Provide an independent donor advocate (MARY ALICE) to assist the potential donor during this process    The following was presented in a language in which donor is able to engage in meaningful dialogue and was reviewed and discussed with the patient by the transplant coordinator and the physician.     The following has been provided:   Education and instruction about all phases of the living donation process includin) Consent  2) The donor must undergo medical and psychosocial evaluations  3) Disclosure that the recovery hospital will take all reasonable precautions to provide confidentiality for the donor/recipient  4) Disclosure that it is a federal crime for any person to knowingly acquire, obtain or otherwise transfer any human organ for valuable consideration  5) The transplant hospital may refuse the potential donor, and the donor could be evaluated by another transplant program with different selection criteria  6) Data from the most recent SRTR  center-specific reports:     A) National 1-year patient and graft survival rates    B) Hospital s 1-year patient and graft survival rates    C) Notification about all CMS outcome requirements not being met by the recovery and recipient s hospitals    The following has been provided:   1) Education about expected post-donation kidney function and how chronic kidney disease and end-stage renal disease might potentially impact the donor in the future to include:    A) On average, donors will have 25-35% permanent loss of kidney function at donation    B) Baseline risk of End Stage Renal Disease (ESRD)  does not exceed that of members of general population with same demographic profile    C) Donor risks must be interpreted in light of known epidemiology of both Chronic Kidney Disease (CKD) or ESRD    D) CKD generally develops in midlife (40-50 years old)    E) ESRD generally develops after age 60    F) Medical evaluation of young potential donor cannot predict lifetime risk  2) Donors may be at higher risk for CKD if they sustain damage to the remaining kidney. 3) Development of CKD and progression to ESRD may be more rapid with only 1 kidney  4) Dialysis is required when reaching ESRD  5) Current practice prioritizes prior living kidney donors who became kidney transplant candidates  6) Alternate procedures or courses of treatment for the recipient, including  donor transplant    A) A  donor kidney may become available for the recipient before donor evaluation is complete or transplant occurs    B) Any transplant candidate may have risk factors for increased morbidity or mortality that are not disclosed to the potential donor  7) The patient will receive a thorough medical and psychosocial evaluation  8) Health information obtained during the evaluation is subject to the same regulations as all records and could reveal conditions that must be reported to local, state, or federal public health  authorities  9) The [facility], [place], is required to report living donor follow up information at 6, 12, and 24 months  10) Potential donors must commit to post operative follow up testing coordinated by the [facility]  11) Any infectious disease or malignancy pertinent to acute recipient care discovered during the potential donor s first two years of follow up care:    A) Will be disclosed to the donor    B) May need to be reported to local, state or federal public health authorities    C) Will be disclosed to their recipient s transplant center, and    D) Will be reported through the OPTN Improving Patient Safety Portal    Disclosure has been provided that these risks may be transient or permanent & include but are not limited to potential medical or surgical risks:    Death    Scars, pain, fatigue, and other consequences typical of any surgical procedure    Decreased kidney function    Abdominal or bowel symptoms such as bloating and nausea, and developing bowel obstruction    Kidney failure and the need for dialysis or kidney transplant for the donor  Impact of obesity, hypertension or other donor-specific medical conditions on morbidity and mortality of the potential donor    DAMIÁN Viera, RN     Transplant Coordinator  Qwirsl-[phone]  [phone]
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
## 2018-07-20 NOTE — PROGRESS NOTES
Outpatient MNT: Kidney Donor Evaluation    Current BMI: 29.1 (HT 5 ft 5 in,  lbs/79.4 kg)  BMI is within criteria of <30 for kidney donation    8 Year Risk of Diabetes  Pt received points for the following criteria: Triglycerides >150; BMI 25-29.9; HDL <40  Total points: 10  8-Year risk of T2DM: <3%     Time Spent: 15 minutes  Visit Type: Initial  Referring Physician: Dr. Russell  Pt accompanied by: Significant other    Nutrition Assessment  Pt had hysterectomy June 11, 2018 and is still recovering from this but has changed her diet to include more healthy items post-op including vegetables and more fruits. She still does eat a diet high in added sugars and starch (rice alone for lunch, sugary cereal, lemonade daily).     Vitamins, Supplements, Pertinent Meds: MVI  Herbal Medicines/Supplements: Echinacea, tumeric/cumin powder pill for headaches     Diet Recall  Breakfast Cereal (Prydeinig toast crunch) w/2% milk and fruit   Lunch [place] (baby swiss) or rice   Dinner Homemade stir kunz with rice and vegetables   Snacks Rarely   Beverages Water, lemonade (unsure if SF or not), propel   Alcohol None - very rarely   Dining out 1x/month     Physical Activity  None since surgery in June - used to walk - plans to get back into this     Anthropometrics  Height:   5 ft 5 in   BMI:    29    Weight Status:Overweight BMI 25-29.9   Weight:  175 lbs            IBW (lb): 56.8 kg  % IBW: 140%    Wt Hx:   Wt Readings from Last 10 Encounters:   07/20/18 79.4 kg (175 lb 1.6 oz)     Adj/dosing BW: 137 lbs/62 kg       Labs  Recent Labs   Lab Test  07/20/18   0735   CHOL  143   HDL  28*   LDL  46   TRIG  345*   **Pt mentions that she did eat dinner late last nightr (~9pm) and she was supposed to have fasted for 12 hrs prior to labs (7pm-7am). Unsure if this would have a significant effect on TG labs. Pt had rice and vegetable stir kunz for dinner .    FBG = 98 (WNL)  A1C = still processing    Prediction of Incident Diabetes Mellitus in  Middle-aged Adults: The [place] Offspring Study  Carloz Portillo MD; James B. Meigs, MD, MPH; Geraldine Guzman, PhD; Mya Nichols MD, MPH; Robi Cortez MD; Leo Martinez Sr,   PhD  Pt's estimated risk for T2DM (per Table 6 above)  Pt received points for the following criteria: Triglycerides >150; BMI 25-29.9; HDL <40  Total points: 10  8-Year risk of T2DM: <3%    Estimated Nutrition Needs  Energy  5588-5182 kcal/day    (20-25 kcal/kg for desired weight loss)      Protein  50-62 g PRO/day    (0.8-1 g/kg for maintenance)           Fluid  1 ml/kcal or per MD     Nutrition Diagnosis  Obesity r/t excessive energy intake and inadequate physical activity AEB BMI 29.1.     Food and nutrition related knowledge deficit r/t pre transplant donor eval pt AEB pt verbalized not hearing pre/post transplant diet guidelines.    Nutrition Intervention  Nutrition education provided:  1. Discussed strategies for wt loss (portion sizes, increased activity, lower fat and lower sugar options for cereal). Also discussed strategies for lowering triglycerides and provided handout: Tips for Lowering Triglycerides. Discussed trying to choose cereal higher in fiber and lower sugar and also addition of a protein at lunch with the rice or sandwich such as beans, hard boiled egg, nuts, or canned tuna in water. Also provided pt with handout on Eating Well on a Budget to help pt choose healthy non-perishable items as well as fruits and vegetables in season that are budget-friendly.   2. Reviewed overall healthy diet guidelines for pre and post kidney donation. Discussed maintenance of a healthy weight, Na+ intake <3000 mg/day, and avoidance of herbal supplements post donation d/t unknown effects on the kidney.  3. Discussed herbal supplements pt is currently taking and advised pt to consult with MD about whether these are safe to consume after donation.     Patient Understanding: Pt verbalized understanding of education  provided.  Expected Compliance: Good  Follow-Up Plans: PRN     Nutrition Goals  Pt to verbalize understanding of education provided     Provided pt with contact info.   Myla Bob MS, RD, LD  Pgr [phone]

## 2018-07-20 NOTE — LETTER
7/20/2018       RE: Kinjal Crain  [address]  [facility] [address]     Dear Colleague,    Thank you for referring your patient, Kinjal Crain, to the [facility] ORGAN TRANSPLANT at [facility]. Please see a copy of my visit note below.    RE: Kinjal Crain  [facility] #[id number]           I saw your patient, Kinjal Crain, in consultation in our pretransplant clinic. She was here at clinic for evaluation as a possible kidney donor to someone she heard about via social media. She presented to clinic today with her significant other.  She had seen our donor video.  Our donor coordinator shared our center-specific results with her.  We discussed:    (1) The risks of donation--including mortality (0.03% risk) and morbidity (approximately 1% risk of major morbidity).  We discussed the major reported causes of death after kidney donation (bleeding, infection, pulmonary embolism).  We discussed the potential complications, including:  bleeding requiring reoperation, infection requiring reoperation, pneumonia, bowel obstruction, infection at the site of the incision, hernia at the site of the incision, deep vein thrombosis, deep vein thrombosis with associated pulmonary embolism, and urinary tract infection.  I told her I could not possibly name all of the risks, but that she was at risk for any complications that could occur in any operation (and that a local library would have books/resources that could provide more detail).       (2) We also discussed the long-term risks of living with one kidney.  We talked in detail about the new publications suggesting slightly increased long-term risk of ESRD after donor nephrectomy.  We discussed the fact that most of the ESRD that has developed after donation has occurred in those donating to a relative; and that it is known that individuals who have a relative with ESRD are at increased risk of ESRD.  For people her age, there is  really no data to show what the outcome will be at 50-60 years.  This is an important consideration.  We discussed the rare diseases that might affect having only one kidney.    (3) The hospital stay and the fact that if all goes well, discharge would occur within two to three days after donor nephrectomy.  We also discussed the fact that there would be no diet or fluid restrictions (again if all went well), and that the only new medication that she would be on would be pain medication.  We discussed the fact that most patients are on Tylenol or something similar within five to six days of surgery.      (4) The recovery time after surgery and the restrictions that are necessary:  a) no driving for a couple of weeks (or until she felt that her reaction would be adequate if she had to slam on the brakes; and b) no lifting over 10 pounds or any exercise that would stretch her abdominal muscles for six weeks (to allow the muscles to heal so that she doesn't develop a hernia).  We also discussed return to work, which could occur in approximately three to four weeks if she had a desk job or would require not returning to work for at least six weeks if the job required any heavy lifting or exercise.  We discussed the potential for not getting her pep and energy back for anywhere from two to six weeks after surgery and how there was a tremendous individual variation in return of full energy level.  I discussed our donor follow-up studies; and that in our surveys, 90% of donors had their energy back by 6 weeks postdonation.    (5) The concern about long distance travel for the first couple of weeks post-donation.  We discussed the risks of deep vein thrombosis associated with plane or car travel.  I recommended that, if flying, she should get up and walk around every 30-40 minutes; if driving she should ask the  to stop the car every 30-45 minutes so Ms. Crain could take a short walk.    (6) The fact that the  recipient could be on a waiting list for  donor transplant and would ultimately receive a kidney if Ms. Crain did not donate.      Finally, they were concerned that the potential recipient might have a disease that would recur and destroy the transplant. Our donor coordinator was able to look at the cause of recipient ESRD; it is not a potentially recurrent disease.    I attempted to answer any remaining questions.  I also told her that should she have any questions, she should feel free to contact us.  We would be glad to answer any questions either over the phone or at another clinic visit.  Her transplant coordinator is Jazmin Madrid and may be reached at [phone].  Thank you for the opportunity to see her.    I spent 40 minutes with this patient.  Over 95% that time was spent in counseling and coordination of care.             Yours truly,               Geovany Russell MD         Professor of Surgery         ([phone])      AJM/st    Again, thank you for allowing me to participate in the care of your patient.      Sincerely,    Geovany Russell MD

## 2018-07-20 NOTE — MR AVS SNAPSHOT
After Visit Summary   7/20/2018    Kinjal Crain    MRN: [id number]           Patient Information     Date Of Birth          1995        Visit Information        Provider Department      7/20/2018 10:00 AM Geovany Russell MD [facility] Solid Organ Transplant        Today's Diagnoses     Transplant donor evaluation    -  1       Follow-ups after your visit        Your next 10 appointments already scheduled     Jul 20, 2018  1:20 PM CDT   ecg with UC CV EKG   [facility] Xray ([facility])    [address] [place] [id number] [phone]            Jul 20, 2018  1:45 PM CDT   XR CHEST 2 VIEWS with UCXR1   [facility] ([facility])    [address] [place] [id number] [phone]           Please bring a list of your current medicines to your exam. (Include vitamins, minerals and over-thecounter medicines.) Leave your valuables at home.  Tell your doctor if there is a chance you may be pregnant.  You do not need to do anything special for this exam.            Jul 20, 2018  2:00 PM CDT   CTA RENAL WITH CONTRAST with UCCT2   [facility] CT ([facility])    [address] [id number] [phone]           Please bring any scans or X-rays taken at other hospitals, if similar tests were done. Also bring a list of your medicines, including vitamins, minerals and over-the-counter drugs. It is safest to leave personal items at home.  Be sure to tell your doctor:   If you have any allergies.   If there s any chance you are pregnant.   If you are breastfeeding.    If you have diabetes as your medication may need to be adjusted for this exam.  You will have contrast for this exam. To prepare:   Do not eat or drink for 2 hours before your exam. If you need to take medicine, you may take it with small sips of  "water. (We may ask you to take liquid medicine as well.)   The day before your exam, drink extra fluids at least six 8-ounce glasses (unless your doctor tells you to restrict your fluids).  Patients over 70 or patients with diabetes or kidney problems:   If you haven t had a blood test (creatinine test) within the last 30 days, the Cardiologist/Radiologist may require you to get this test prior to your exam.  Please wear loose clothing, such as a sweat suit or jogging clothes. Avoid snaps, zippers and other metal. We may ask you to undress and put on a hospital gown.  If you have any questions, please call the Imaging Department where you will have your exam.              Who to contact     If you have questions or need follow up information about today's clinic visit or your schedule please contact [facility] SOL[facility] ORGAN TRANSPLANT directly at [phone].  Normal or non-critical lab and imaging results will be communicated to you by com[employer], letter or phone within 4 business days after the clinic has received the results. If you do not hear from us within 7 days, please contact the clinic through [employer] or phone. If you have a critical or abnormal lab result, we will notify you by phone as soon as possible.  Submit refill requests through [employer] or call your pharmacy and they will forward the refill request to us. Please allow 3 business days for your refill to be completed.          Additional Information About Your Visit        [employer] Information     [employer] lets you send messages to your doctor, view your test results, renew your prescriptions, schedule appointments and more. To sign up, go to [email or web address] . Click on \"Log in\" on the left side of the screen, which will take you to the Welcome page. Then click on \"Sign up Now\" on the right side of the page.     You will be asked to enter the access code listed below, as well as some personal information. Please follow the directions to create your " username and password.     Your access code is: [email or web address]  Expires: 10/14/2018  6:30 AM     Your access code will  in 90 days. If you need help or a new code, please call your [facility] or [phone].        Care EveryWhere ID     This is your Care EveryWhere ID. This could be used by other organizations to access your [facility] medical records  [id number]         Blood Pressure from Last 3 Encounters:   18 128/85    Weight from Last 3 Encounters:   18 79.4 kg (175 lb 0.7 oz)   18 79.4 kg (175 lb 1.6 oz)              Today, you had the following     No orders found for display       Primary Care Provider Fax #    Physician No Ref-Primary [phone]       No address on file        Equal Access to Services     [facility] Center[facility] : Hadii raphael Acuña, wapamela arce, dinesh kaalmapurvi olivia, luann anderson . So [facility] [phone].    ATENCIÓN: Si habla español, tiene a medina disposición servicios gratuitos de asistencia lingüística. Arnulfo al [phone].    We comply with applicable federal civil rights laws and [place] laws. We do not discriminate on the basis of race, color, national origin, age, disability, sex, sexual orientation, or gender identity.            Thank you!     Thank you for choosing [facility]  for your care. Our goal is always to provide you with excellent care. Hearing back from our patients is one way we can continue to improve our services. Please take a few minutes to complete the written survey that you may receive in the mail after your visit with us. Thank you!             Your Updated Medication List - Protect others around you: Learn how to safely use, store and throw away your medicines at www.disposemymeds.org.          This list is accurate as of 18  1:11 PM.  Always use your most recent med list.                   Brand Name Dispense Instructions for use Diagnosis    cetirizine 10 MG tablet     zyrTEC     Take 10 mg by mouth daily        omeprazole 20 MG CR capsule    priLOSEC     Take 20 mg by mouth daily        topiramate 50 MG tablet    TOPAMAX     Take 25 mg by mouth 2 times daily

## 2018-07-20 NOTE — LETTER
7/20/2018      RE: Kinjal Breann  [address]  [facility] [address]       Assessment and Plan:  1. Prospective kidney transplant donor with some issues that needs to be addressed prior to donation. Patient's blood pressure is acceptable at this visit, kidney function appears to be acceptable with Iohexol pending, and urinalysis is bland.  1.  Recent weight gain following her hysterectomy with biochemical profile suggestive of impaired metabolism.  We discussed the need to lose the weight as this is likely distant to happen again after her donor nephrectomy unless she continues to watch her diet following donation and be able to engage in light exercises as early as possible.  She verbalized her understanding and the prudence of losing the weight now prior to undergoing another surgery and stacking on the weight of 2 surgeries.  2.  History of tachycardia induced by anesthesia: Patient underwent formal evaluation by cardiology with an echo and stress test would like to obtain these records for review.  3.  Long-standing GERD for which she is on PPI for the last 6 years which she has not been able to get off off.  On this note it is important to make sure there is not any ongoing pathology with the stomach I suggested that she returns to her PCP and potentially obtain an EGD.  Owing to the increasing body of literature that PPI may be linked to CKD, it may be appropriate to use an H2 blocker to see if that helps her symptoms.  4.  Dysfunctional uterine bleeding status post hysterectomy she is healed well but had gained 13 pounds in the last 6 weeks.  5.  Mild hyperuricemia which may be a sign of impaired metabolism would like to repeat after she had lost her weight and return to her usual diet.  6.  Mildly elevated alk phos no signs or symptoms of liver disease or gallbladder discomfort at this point will just repeat once she had lost the weight with her other labs that need to be collected at that time including a  UA    Discussed the risks of donating a kidney, including the surgical risk and the possible risks of living with one kidney.    Education about expected post-donation kidney function and how chronic kidney disease (CKD) and end stage kidney disease (ESKD) might potentially impact the donor in the future, include, but not limited to:       - On average, donors will have 25-35% permanent loss of kidney function at donation.       - Baseline risk of ESKD may slightly exceed that of members of the general population with the same demographic profile.       - Donor risks must be interpreted in light of known epidemiology of both CKD or ESKD, such as that CKD generally develops in midlife (40-50 years old) and ESKD generally develops after age 60.       - Medical evaluation of young potential donors cannot predict lifetime risk of CKD or ESKD.       - Donors may be at higher risk for CKD if they sustain damage to the remaining kidney.       - Development of CKD and progression of ESKD may be more rapid with only 1 kidney.       - Some type of kidney replacement therapy, either kidney transplant or dialysis, is required when reaching ESKD.    Potential medical or surgical risks include, but not limited to:       - Death.       - Scars, pain, fatigue, and other consequences typical of any surgical procedure.       - Decreased kidney function.       - Abdominal or bowel symptoms, such as bloating and nausea, and developing bowel obstruction.       - Kidney failure (ESKD) and the need for a kidney transplant or dialysis for the donor.       - Impact of obesity, hypertension, or other donor-specific medical conditions on morbidity and mortality of the potential donor.    Patients overall evaluation will be discussed with the transplant team and a final recommendation on the patients' suitability to be a kidney transplant donor will be made at that time.    Consult:  Kinjal Crain was seen in consultation at the request of  Dr. Geovany Russell for evaluation as a potential kidney transplant donor.    Reason for Visit:  Kinjal Crain is a 22 year old female who presents for a kidney donor evaluation.  Patient would like to donate to a person in need identified through social.    HPI:      Kinjal Crain is a 23-year-old lady who presents today accompanied by her partner to complete her donor evaluation.  She is interested in donating a kidney to a friend that she learned about their need through Facebook.  She thought about the matter and she looked different websites and she decided to proceed with the evaluation.  Kinjal is generally healthy with few chronic conditions such as GERD for which she is on PPI for the last 6 years and history of dysfunctional uterine bleeding for which she underwent total hysterectomy and now she is healing well.  In the last 6 weeks as she recovered from her surgery she has not been watching her diet and she noted 13 pounds of weight gain.  She is now feeling well enough to pursue her routine dietary modification and exercises.  She had history of tachycardia that was first noted during anesthesia episode for which she underwent formal assessment by cardiology and reports that she had an echo and stress test.  She specifically denied any fainting passing out, or family history of sudden cardiac death.  She reports that she has good social support through her domestic partner who she had been with for the last 10 years.         Kidney Disease Hx:       h/o Kidney Problems: No  Family h/o Genetic Kidney Disease: No       h/o HTN: No    Usual BP: 120/80       h/o Protein in Urine: No  h/o Blood in Urine: No       h/o Kidney Stones: No  h/o Kidney Injury: No       h/o Recurrent UTI: had couple of UTIs 1 year apart   h/o Genitourinary Problems: No       h/o Chronic NSAID Use: No         Other Medical Hx:       h/o DM: No        h/o Preeclampsia: Not applicable          h/o Gastrointestinal, Pancreas or  Liver Problems: No       h/o Lung or Heart Problems: Yes: Hx of tachycardia        h/o Hematologic Problems: No  h/o Bleeding or Clotting Problems: No       h/o Cancer: No       h/o Infection Problems: No         Skin Cancer Risk:       h/o more than 50 moles: No       h/o extensive sun exposure: No       h/o melanoma: No       Family h/o melanoma: No         Mental Health Assessment:       h/o Depression: No       h/o Psychiatric Illness: No       h/o Suicidal Attempt(s): No    ROS:   A comprehensive review of systems was obtained and negative, except as noted in the HPI or PMH.    PMH:   History was taken from the patient as noted below.  Past Medical History:   Diagnosis Date     DUB (dysfunctional uterine bleeding)     s/p hysterectomy      GERD (gastroesophageal reflux disease)      Migraine     botox     Tachycardia        PSH:   Past Surgical History:   Procedure Laterality Date     HYSTERECTOMY       TONSILLECTOMY       Personal or family history of anesthesia problems: nausea and tachycardia    Family Hx:   Family History   Problem Relation Age of Onset     No Known Problems Mother      No Known Problems Father      No Known Problems Sister      No Known Problems Brother           Specific Family Hx:       FH of DM: No       FH of CAD: No  FH of HTN: No       FH of Cancer: Yes   FH of Kidney Cancer: No    Personal Hx:   Social History     Social History     Marital status: Single     Spouse name: N/A     Number of children: N/A     Years of education: N/A     Occupational History     Not on file.     Social History Main Topics     Smoking status: Never Smoker     Smokeless tobacco: Never Used     Alcohol use Yes      Comment: Occasional- 1-2x per year     Drug use: No     Sexual activity: Not on file     Other Topics Concern     Not on file     Social History Narrative     No narrative on file          Specific Social Hx:        Health Insurance Status: Yes       Employment Status: Full time  Occupation:  "Customer service                    Living Arrangements: lives with an adult        Social Support: Yes       Presence of increased risk for disease transmission behaviors as defined by PHS guidelines: No        Allergies:  Allergies   Allergen Reactions     Indocin [Indomethacin]      Dizziness       Norco [Hydrocodone-Acetaminophen] Nausea and Vomiting     Penicillins Hives     Allergy when an infant     Latex Rash     Sumatriptan Palpitations       Medications:  Prior to Admission medications    Medication Sig Start Date End Date Taking? Authorizing Provider   cetirizine (ZYRTEC) 10 MG tablet Take 10 mg by mouth daily   Yes Reported, Patient   omeprazole (PRILOSEC) 20 MG CR capsule Take 20 mg by mouth daily 6/22/18  Yes Reported, Patient   topiramate (TOPAMAX) 50 MG tablet Take 25 mg by mouth 2 times daily   Yes Reported, Patient       Vitals:  Vital Signs 7/20/2018 7/20/2018 7/20/2018   Systolic - - 116   Diastolic - - 77   Pulse - - 93   Temperature - - 97.8   Respirations - - 18   Weight (LB) - 175 lb 1.6 oz 175 lb 0.7 oz   Height - 5' 5\" 5' 5\"   BMI (Calculated) - 29.2 29.19   Pain 0 - -   O2 - - 100       Exam:   GENERAL APPEARANCE: alert and no distress  HENT: mouth without ulcers or lesions  LYMPHATICS: no cervical or supraclavicular nodes  RESP: lungs clear to auscultation - no rales, rhonchi or wheezes  CV: regular rhythm, normal rate, no rub, no murmur  EDEMA: no LE edema bilaterally  ABDOMEN: soft, nondistended, nontender, bowel sounds normal  MS: extremities normal - no gross deformities noted, no evidence of inflammation in joints, no muscle tenderness  SKIN: no rash    Results:   Labs and imaging were ordered for this visit and reviewed by me.  Recent Results (from the past 336 hour(s))   Hemoglobin    Collection Time: 07/10/18 11:42 AM   Result Value Ref Range    Hemoglobin (External) 14.1 11.8 - 15.1 g/dL   Creatinine    Collection Time: 07/10/18 11:42 AM   Result Value Ref Range    " Creatinine (External) 0.68 0.60 - 1.10 mg/dL    GFR Estimated (External) 124 mls/min/1.73m2   Glucose    Collection Time: 07/10/18 11:42 AM   Result Value Ref Range    Glucose (External) 95 70 - 99 mg/dL   UA with Microscopic    Collection Time: 07/10/18 11:43 AM   Result Value Ref Range    WBC Urine (External) 3 <=5 /HPF    RBC Urine (External) 0 <=2 /HPF    Squamous Epithelial Urine (External) Occasional     Bacteria Urine (External) <10 <10 /HPF    Crystal Urine (External) Moderate     Color Urine (External) YEL Yellow    Appearance Urine (External) Slightyly cloudy Clear to Slightyly cloudy    Leukocyte Esterase Urine (External) Trace (A) Negative    Nitrites Urine (External) NEG Negative    Blood Urine (External) NEG Negative    Specific Gravity Urine (External) 1.015 1.005 - 1.025    Ketones Urine (External) NEG Negative    Bilirubin Urine (External) NEG Negative    Glucose Urine (External) NEG Negative    Protein Albumin Ur (External) Negative Negative    pH Urine (External) 7.0 5.0 - 8.0    Source Urine (External) Clean catch    Albumin Random Urine Quantitative with Creat Ratio    Collection Time: 07/10/18 11:43 AM   Result Value Ref Range    Microalbumin Urine mg/g Cr (External) Unable to calculate    ABO type    Collection Time: 07/10/18 11:48 AM   Result Value Ref Range    ABO (External) O     Rh (External) Pos    Protein  random urine with Creat Ratio    Collection Time: 07/20/18  7:23 AM   Result Value Ref Range    Protein Random Urine 0.14 g/L    Protein Total Urine g/gr Creatinine 0.11 0 - 0.2 g/g Cr   Routine UA with microscopic    Collection Time: 07/20/18  7:23 AM   Result Value Ref Range    Color Urine Yellow     Appearance Urine Slightly Cloudy     Glucose Urine Negative NEG^Negative mg/dL    Bilirubin Urine Negative NEG^Negative    Ketones Urine Negative NEG^Negative mg/dL    Specific Gravity Urine 1.016 1.003 - 1.035    Blood Urine Negative NEG^Negative    pH Urine 5.0 5.0 - 7.0 pH    Protein  Albumin Urine Negative NEG^Negative mg/dL    Urobilinogen mg/dL 0.0 0.0 - 2.0 mg/dL    Nitrite Urine Negative NEG^Negative    Leukocyte Esterase Urine Small (A) NEG^Negative    Source Midstream Urine     WBC Urine 6 (H) 0 - 5 /HPF    RBC Urine 2 0 - 2 /HPF    Bacteria Urine Few (A) NEG^Negative /HPF    Squamous Epithelial /HPF Urine 11 (H) 0 - 1 /HPF    Mucous Urine Present (A) NEG^Negative /LPF   Albumin Random Urine Quantitative with Creat Ratio    Collection Time: 07/20/18  7:23 AM   Result Value Ref Range    Creatinine Urine 130 mg/dL    Albumin Urine mg/L 9 mg/L    Albumin Urine mg/g Cr 7.07 0 - 25 mg/g Cr   ABO/Rh type and screen    Collection Time: 07/20/18  7:35 AM   Result Value Ref Range    ABO O     RH(D) Pos     Antibody Screen Neg     Test Valid Only At          [facility]    Specimen Expires 07/23/2018    Lipid Profile    Collection Time: 07/20/18  7:35 AM   Result Value Ref Range    Cholesterol 143 <200 mg/dL    Triglycerides 345 (H) <150 mg/dL    HDL Cholesterol 28 (L) >49 mg/dL    LDL Cholesterol Calculated 46 <100 mg/dL    Non HDL Cholesterol 115 <130 mg/dL   Comprehensive metabolic panel    Collection Time: 07/20/18  7:35 AM   Result Value Ref Range    Sodium 138 133 - 144 mmol/L    Potassium 3.8 3.4 - 5.3 mmol/L    Chloride 107 94 - 109 mmol/L    Carbon Dioxide 23 20 - 32 mmol/L    Anion Gap 9 3 - 14 mmol/L    Glucose 98 70 - 99 mg/dL    Urea Nitrogen 17 7 - 30 mg/dL    Creatinine 0.75 0.52 - 1.04 mg/dL    GFR Estimate >90 >60 mL/min/1.7m2    GFR Estimate If Black >90 >60 mL/min/1.7m2    Calcium 8.8 8.5 - 10.1 mg/dL    Bilirubin Total 0.2 0.2 - 1.3 mg/dL    Albumin 4.1 3.4 - 5.0 g/dL    Protein Total 7.6 6.8 - 8.8 g/dL    Alkaline Phosphatase 164 (H) 40 - 150 U/L    ALT 41 0 - 50 U/L    AST 25 0 - 45 U/L   Phosphorus    Collection Time: 07/20/18  7:35 AM   Result Value Ref Range    Phosphorus 4.4 2.5 - 4.5 mg/dL   Hemoglobin A1c    Collection Time:  07/20/18  7:35 AM   Result Value Ref Range    Hemoglobin A1C 5.4 0 - 5.6 %   INR    Collection Time: 07/20/18  7:35 AM   Result Value Ref Range    INR 0.98 0.86 - 1.14   Partial thromboplastin time    Collection Time: 07/20/18  7:35 AM   Result Value Ref Range    PTT 27 22 - 37 sec   CBC with platelets    Collection Time: 07/20/18  7:35 AM   Result Value Ref Range    WBC 9.5 4.0 - 11.0 10e9/L    RBC Count 4.88 3.8 - 5.2 10e12/L    Hemoglobin 14.0 11.7 - 15.7 g/dL    Hematocrit 41.3 35.0 - 47.0 %    MCV 85 78 - 100 fl    MCH 28.7 26.5 - 33.0 pg    MCHC 33.9 31.5 - 36.5 g/dL    RDW 11.9 10.0 - 15.0 %    Platelet Count 344 150 - 450 10e9/L   Uric acid    Collection Time: 07/20/18  7:35 AM   Result Value Ref Range    Uric Acid 6.7 (H) 2.6 - 6.0 mg/dL             [facility]ney Donor Eval

## 2018-07-20 NOTE — LETTER
Date:July 23, 2018      Patient was self referred, no letter generated. Do not send.        [facility] Health Information

## 2018-07-20 NOTE — DISCHARGE INSTRUCTIONS

## 2018-07-20 NOTE — PROGRESS NOTES
Assessment and Plan:  1. Prospective kidney transplant donor with some issues that needs to be addressed prior to donation. Patient's blood pressure is acceptable at this visit, kidney function appears to be acceptable with Iohexol pending, and urinalysis is bland.  1.  Recent weight gain following her hysterectomy with biochemical profile suggestive of impaired metabolism.  We discussed the need to lose the weight as this is likely distant to happen again after her donor nephrectomy unless she continues to watch her diet following donation and be able to engage in light exercises as early as possible.  She verbalized her understanding and the prudence of losing the weight now prior to undergoing another surgery and stacking on the weight of 2 surgeries.  2.  History of tachycardia induced by anesthesia: Patient underwent formal evaluation by cardiology with an echo and stress test would like to obtain these records for review.  3.  Long-standing GERD for which she is on PPI for the last 6 years which she has not been able to get off off.  On this note it is important to make sure there is not any ongoing pathology with the stomach I suggested that she returns to her PCP and potentially obtain an EGD.  Owing to the increasing body of literature that PPI may be linked to CKD, it may be appropriate to use an H2 blocker to see if that helps her symptoms.  4.  Dysfunctional uterine bleeding status post hysterectomy she is healed well but had gained 13 pounds in the last 6 weeks.  5.  Mild hyperuricemia which may be a sign of impaired metabolism would like to repeat after she had lost her weight and return to her usual diet.  6.  Mildly elevated alk phos no signs or symptoms of liver disease or gallbladder discomfort at this point will just repeat once she had lost the weight with her other labs that need to be collected at that time including a UA    Discussed the risks of donating a kidney, including the surgical risk  and the possible risks of living with one kidney.    Education about expected post-donation kidney function and how chronic kidney disease (CKD) and end stage kidney disease (ESKD) might potentially impact the donor in the future, include, but not limited to:       - On average, donors will have 25-35% permanent loss of kidney function at donation.       - Baseline risk of ESKD may slightly exceed that of members of the general population with the same demographic profile.       - Donor risks must be interpreted in light of known epidemiology of both CKD or ESKD, such as that CKD generally develops in midlife (40-50 years old) and ESKD generally develops after age 60.       - Medical evaluation of young potential donors cannot predict lifetime risk of CKD or ESKD.       - Donors may be at higher risk for CKD if they sustain damage to the remaining kidney.       - Development of CKD and progression of ESKD may be more rapid with only 1 kidney.       - Some type of kidney replacement therapy, either kidney transplant or dialysis, is required when reaching ESKD.    Potential medical or surgical risks include, but not limited to:       - Death.       - Scars, pain, fatigue, and other consequences typical of any surgical procedure.       - Decreased kidney function.       - Abdominal or bowel symptoms, such as bloating and nausea, and developing bowel obstruction.       - Kidney failure (ESKD) and the need for a kidney transplant or dialysis for the donor.       - Impact of obesity, hypertension, or other donor-specific medical conditions on morbidity and mortality of the potential donor.    Patients overall evaluation will be discussed with the transplant team and a final recommendation on the patients' suitability to be a kidney transplant donor will be made at that time.    Consult:  Kinjal Crain was seen in consultation at the request of Dr. Geovany Russell for evaluation as a potential kidney transplant  donor.    Reason for Visit:  Kinjal Crain is a 22 year old female who presents for a kidney donor evaluation.  Patient would like to donate to a person in need identified through social.    HPI:      Kinjal Crain is a 23-year-old lady who presents today accompanied by her partner to complete her donor evaluation.  She is interested in donating a kidney to a friend that she learned about their need through Facebook.  She thought about the matter and she looked different websites and she decided to proceed with the evaluation.  Kinjal is generally healthy with few chronic conditions such as GERD for which she is on PPI for the last 6 years and history of dysfunctional uterine bleeding for which she underwent total hysterectomy and now she is healing well.  In the last 6 weeks as she recovered from her surgery she has not been watching her diet and she noted 13 pounds of weight gain.  She is now feeling well enough to pursue her routine dietary modification and exercises.  She had history of tachycardia that was first noted during anesthesia episode for which she underwent formal assessment by cardiology and reports that she had an echo and stress test.  She specifically denied any fainting passing out, or family history of sudden cardiac death.  She reports that she has good social support through her domestic partner who she had been with for the last 10 years.         Kidney Disease Hx:       h/o Kidney Problems: No  Family h/o Genetic Kidney Disease: No       h/o HTN: No    Usual BP: 120/80       h/o Protein in Urine: No  h/o Blood in Urine: No       h/o Kidney Stones: No  h/o Kidney Injury: No       h/o Recurrent UTI: had couple of UTIs 1 year apart   h/o Genitourinary Problems: No       h/o Chronic NSAID Use: No         Other Medical Hx:       h/o DM: No        h/o Preeclampsia: Not applicable          h/o Gastrointestinal, Pancreas or Liver Problems: No       h/o Lung or Heart Problems: Yes: Hx of  tachycardia        h/o Hematologic Problems: No  h/o Bleeding or Clotting Problems: No       h/o Cancer: No       h/o Infection Problems: No         Skin Cancer Risk:       h/o more than 50 moles: No       h/o extensive sun exposure: No       h/o melanoma: No       Family h/o melanoma: No         Mental Health Assessment:       h/o Depression: No       h/o Psychiatric Illness: No       h/o Suicidal Attempt(s): No    ROS:   A comprehensive review of systems was obtained and negative, except as noted in the HPI or PMH.    PMH:   History was taken from the patient as noted below.  Past Medical History:   Diagnosis Date     DUB (dysfunctional uterine bleeding)     s/p hysterectomy      GERD (gastroesophageal reflux disease)      Migraine     botox     Tachycardia        PSH:   Past Surgical History:   Procedure Laterality Date     HYSTERECTOMY       TONSILLECTOMY       Personal or family history of anesthesia problems: nausea and tachycardia    Family Hx:   Family History   Problem Relation Age of Onset     No Known Problems Mother      No Known Problems Father      No Known Problems Sister      No Known Problems Brother           Specific Family Hx:       FH of DM: No       FH of CAD: No  FH of HTN: No       FH of Cancer: Yes   FH of Kidney Cancer: No    Personal Hx:   Social History     Social History     Marital status: Single     Spouse name: N/A     Number of children: N/A     Years of education: N/A     Occupational History     Not on file.     Social History Main Topics     Smoking status: Never Smoker     Smokeless tobacco: Never Used     Alcohol use Yes      Comment: Occasional- 1-2x per year     Drug use: No     Sexual activity: Not on file     Other Topics Concern     Not on file     Social History Narrative     No narrative on file          Specific Social Hx:        Health Insurance Status: Yes       Employment Status: Full time  Occupation: Customer service                    Living Arrangements: lives  "with an adult        Social Support: Yes       Presence of increased risk for disease transmission behaviors as defined by [facility] guidelines: No        Allergies:  Allergies   Allergen Reactions     Indocin [Indomethacin]      Dizziness       Norco [Hydrocodone-Acetaminophen] Nausea and Vomiting     Penicillins Hives     Allergy when an infant     Latex Rash     Sumatriptan Palpitations       Medications:  Prior to Admission medications    Medication Sig Start Date End Date Taking? Authorizing Provider   cetirizine (ZYRTEC) 10 MG tablet Take 10 mg by mouth daily   Yes Reported, Patient   omeprazole (PRILOSEC) 20 MG CR capsule Take 20 mg by mouth daily 6/22/18  Yes Reported, Patient   topiramate (TOPAMAX) 50 MG tablet Take 25 mg by mouth 2 times daily   Yes Reported, Patient       Vitals:  Vital Signs 7/20/2018 7/20/2018 7/20/2018   Systolic - - 116   Diastolic - - 77   Pulse - - 93   Temperature - - 97.8   Respirations - - 18   Weight (LB) - 175 lb 1.6 oz 175 lb 0.7 oz   Height - 5' 5\" 5' 5\"   BMI (Calculated) - 29.2 29.19   Pain 0 - -   O2 - - 100       Exam:   GENERAL APPEARANCE: alert and no distress  HENT: mouth without ulcers or lesions  LYMPHATICS: no cervical or supraclavicular nodes  RESP: lungs clear to auscultation - no rales, rhonchi or wheezes  CV: regular rhythm, normal rate, no rub, no murmur  EDEMA: no LE edema bilaterally  ABDOMEN: soft, nondistended, nontender, bowel sounds normal  MS: extremities normal - no gross deformities noted, no evidence of inflammation in joints, no muscle tenderness  SKIN: no rash    Results:   Labs and imaging were ordered for this visit and reviewed by me.  Recent Results (from the past 336 hour(s))   Hemoglobin    Collection Time: 07/10/18 11:42 AM   Result Value Ref Range    Hemoglobin (External) 14.1 11.8 - 15.1 g/dL   Creatinine    Collection Time: 07/10/18 11:42 AM   Result Value Ref Range    Creatinine (External) 0.68 0.60 - 1.10 mg/dL    GFR Estimated " (External) 124 mls/min/1.73m2   Glucose    Collection Time: 07/10/18 11:42 AM   Result Value Ref Range    Glucose (External) 95 70 - 99 mg/dL   UA with Microscopic    Collection Time: 07/10/18 11:43 AM   Result Value Ref Range    WBC Urine (External) 3 <=5 /HPF    RBC Urine (External) 0 <=2 /HPF    Squamous Epithelial Urine (External) Occasional     Bacteria Urine (External) <10 <10 /HPF    Crystal Urine (External) Moderate     Color Urine (External) YEL Yellow    Appearance Urine (External) Slightyly cloudy Clear to Slightyly cloudy    Leukocyte Esterase Urine (External) Trace (A) Negative    Nitrites Urine (External) NEG Negative    Blood Urine (External) NEG Negative    Specific Gravity Urine (External) 1.015 1.005 - 1.025    Ketones Urine (External) NEG Negative    Bilirubin Urine (External) NEG Negative    Glucose Urine (External) NEG Negative    Protein Albumin Ur (External) Negative Negative    pH Urine (External) 7.0 5.0 - 8.0    Source Urine (External) Clean catch    Albumin Random Urine Quantitative with Creat Ratio    Collection Time: 07/10/18 11:43 AM   Result Value Ref Range    Microalbumin Urine mg/g Cr (External) Unable to calculate    ABO type    Collection Time: 07/10/18 11:48 AM   Result Value Ref Range    ABO (External) O     Rh (External) Pos    Protein  random urine with Creat Ratio    Collection Time: 07/20/18  7:23 AM   Result Value Ref Range    Protein Random Urine 0.14 g/L    Protein Total Urine g/gr Creatinine 0.11 0 - 0.2 g/g Cr   Routine UA with microscopic    Collection Time: 07/20/18  7:23 AM   Result Value Ref Range    Color Urine Yellow     Appearance Urine Slightly Cloudy     Glucose Urine Negative NEG^Negative mg/dL    Bilirubin Urine Negative NEG^Negative    Ketones Urine Negative NEG^Negative mg/dL    Specific Gravity Urine 1.016 1.003 - 1.035    Blood Urine Negative NEG^Negative    pH Urine 5.0 5.0 - 7.0 pH    Protein Albumin Urine Negative NEG^Negative mg/dL    Urobilinogen  mg/dL 0.0 0.0 - 2.0 mg/dL    Nitrite Urine Negative NEG^Negative    Leukocyte Esterase Urine Small (A) NEG^Negative    Source Midstream Urine     WBC Urine 6 (H) 0 - 5 /HPF    RBC Urine 2 0 - 2 /HPF    Bacteria Urine Few (A) NEG^Negative /HPF    Squamous Epithelial /HPF Urine 11 (H) 0 - 1 /HPF    Mucous Urine Present (A) NEG^Negative /LPF   Albumin Random Urine Quantitative with Creat Ratio    Collection Time: 07/20/18  7:23 AM   Result Value Ref Range    Creatinine Urine 130 mg/dL    Albumin Urine mg/L 9 mg/L    Albumin Urine mg/g Cr 7.07 0 - 25 mg/g Cr   ABO/Rh type and screen    Collection Time: 07/20/18  7:35 AM   Result Value Ref Range    ABO O     RH(D) Pos     Antibody Screen Neg     Test Valid Only At          [facility]    Specimen Expires 07/23/2018    Lipid Profile    Collection Time: 07/20/18  7:35 AM   Result Value Ref Range    Cholesterol 143 <200 mg/dL    Triglycerides 345 (H) <150 mg/dL    HDL Cholesterol 28 (L) >49 mg/dL    LDL Cholesterol Calculated 46 <100 mg/dL    Non HDL Cholesterol 115 <130 mg/dL   Comprehensive metabolic panel    Collection Time: 07/20/18  7:35 AM   Result Value Ref Range    Sodium 138 133 - 144 mmol/L    Potassium 3.8 3.4 - 5.3 mmol/L    Chloride 107 94 - 109 mmol/L    Carbon Dioxide 23 20 - 32 mmol/L    Anion Gap 9 3 - 14 mmol/L    Glucose 98 70 - 99 mg/dL    Urea Nitrogen 17 7 - 30 mg/dL    Creatinine 0.75 0.52 - 1.04 mg/dL    GFR Estimate >90 >60 mL/min/1.7m2    GFR Estimate If Black >90 >60 mL/min/1.7m2    Calcium 8.8 8.5 - 10.1 mg/dL    Bilirubin Total 0.2 0.2 - 1.3 mg/dL    Albumin 4.1 3.4 - 5.0 g/dL    Protein Total 7.6 6.8 - 8.8 g/dL    Alkaline Phosphatase 164 (H) 40 - 150 U/L    ALT 41 0 - 50 U/L    AST 25 0 - 45 U/L   Phosphorus    Collection Time: 07/20/18  7:35 AM   Result Value Ref Range    Phosphorus 4.4 2.5 - 4.5 mg/dL   Hemoglobin A1c    Collection Time: 07/20/18  7:35 AM   Result Value Ref Range    Hemoglobin A1C  5.4 0 - 5.6 %   INR    Collection Time: 07/20/18  7:35 AM   Result Value Ref Range    INR 0.98 0.86 - 1.14   Partial thromboplastin time    Collection Time: 07/20/18  7:35 AM   Result Value Ref Range    PTT 27 22 - 37 sec   CBC with platelets    Collection Time: 07/20/18  7:35 AM   Result Value Ref Range    WBC 9.5 4.0 - 11.0 10e9/L    RBC Count 4.88 3.8 - 5.2 10e12/L    Hemoglobin 14.0 11.7 - 15.7 g/dL    Hematocrit 41.3 35.0 - 47.0 %    MCV 85 78 - 100 fl    MCH 28.7 26.5 - 33.0 pg    MCHC 33.9 31.5 - 36.5 g/dL    RDW 11.9 10.0 - 15.0 %    Platelet Count 344 150 - 450 10e9/L   Uric acid    Collection Time: 07/20/18  7:35 AM   Result Value Ref Range    Uric Acid 6.7 (H) 2.6 - 6.0 mg/dL

## 2018-07-23 LAB
GAMMA INTERFERON BACKGROUND BLD IA-ACNC: 0.02 IU/ML
INTERPRETATION ECG - MUSE: NORMAL
M TB IFN-G BLD-IMP: NEGATIVE
M TB IFN-G CD4+ BCKGRND COR BLD-ACNC: >10 IU/ML
MITOGEN IGNF BCKGRD COR BLD-ACNC: 0 IU/ML
MITOGEN IGNF BCKGRD COR BLD-ACNC: 0 IU/ML

## 2018-07-24 ENCOUNTER — TELEPHONE (OUTPATIENT)
Dept: TRANSPLANT | Facility: CLINIC | Age: 23
End: 2018-07-24

## 2018-07-24 LAB
BSA: 1.93 M2
IOHEXOL CL UR+SERPL-VRATE: 110 ML/MIN
IOHEXOL CL UR+SERPL-VRATE: 3.36 MG/DL
IOHEXOL CL UR+SERPL-VRATE: 7.33 MG/DL
IOHEXOL CL UR+SERPL-VRATE: 98 /1.73 M2

## 2018-07-24 NOTE — TELEPHONE ENCOUNTER
Ok thank you! Sorry for bugging you. :) I did start my motions and getting in with my dr for the possible egd and change of medication for headaches and diet changes.   Thank you!

## 2018-07-25 ENCOUNTER — TELEPHONE (OUTPATIENT)
Dept: TRANSPLANT | Facility: CLINIC | Age: 23
End: 2018-07-25

## 2018-07-25 ENCOUNTER — DOCUMENTATION ONLY (OUTPATIENT)
Dept: TRANSPLANT | Facility: CLINIC | Age: 23
End: 2018-07-25

## 2018-07-25 ENCOUNTER — COMMITTEE REVIEW (OUTPATIENT)
Dept: TRANSPLANT | Facility: CLINIC | Age: 23
End: 2018-07-25

## 2018-07-25 NOTE — COMMITTEE REVIEW
Abdominal Committee Review Note     Evaluation Date:   Committee Review Date: 7/25/2018    Organ being evaluated for: Kidney    Transplant Phase:   Transplant Status:     Transplant Coordinator: Jazmin Madrid  Transplant Surgeon:       Referring Physician: Referred Self    Primary Diagnosis:   Secondary Diagnosis:     Committee Review Members:  Nephrology Eugene Matta MD, Enrique Cardona MD   Nutrition Flavia Vigil,    Pharmacy [facility], [facility]    - Clinical Leida Brooks, [facility], Pepper Castillo, [facility]   Transplant Rita Valdivia, RN, Jazmin Madrid, RN, Geovany Russell MD, Mable Sosa, NP, Anastasia Lundberg LPN, Gomez Fitch MD       Transplant Eligibility: needs more eval and see Cristiane Ku    Committee Review Decision: Needs Re-presentation    Relative Contraindications: see notes    Absolute Contraindications: None    Committee Chair Gomez Fitch MD verbally attested to the committee's decision.    Committee Discussion Details:      Reviewed Baudilio's abnormal evaluation findings:  LOSE 13 LBS AND IN 3 MONTHS REPEAT ALK PHOS,TRYGLERIDES,URIC ACID, UA.  OBTAIN RECORDS FROM ECHO AND STRESS TEST  GET PATHOLOGY FROM [facility]  ENCOURAGE BAUDILIO TO SEE DR REGARDING GERD AND HAVE EGD  DO SPLIT FUNCTION  SEE CRISTIANE KU        Also of note: REDO LABS IN 3 MONTHS TO GIVE BAUDILIO ENOUGH TIME TO LOSE WT.         CT Reviewed and decision to use LEFT OR CHOICE   kidney for donation.       Next Steps: Call Baudilio and let her know plan.  Send a letter to her.

## 2018-07-25 NOTE — TELEPHONE ENCOUNTER
Kinjal is interested in being a living kidney donor for April Oswald.  She came to clinic last week for her evaluation.  She has some further testing that will be needed before approval for donation.  She is in need of help covering donor related travel expenses.  I emailed her NLDAC information today and also sent an email to the recipient social workers requesting NLDAC forms from recipient.  Once both worksheets are completed, I will file [facility] rajesh request.

## 2018-07-26 ENCOUNTER — TELEPHONE (OUTPATIENT)
Dept: TRANSPLANT | Facility: CLINIC | Age: 23
End: 2018-07-26

## 2018-07-31 ENCOUNTER — TELEPHONE (OUTPATIENT)
Dept: TRANSPLANT | Facility: CLINIC | Age: 23
End: 2018-07-31

## 2018-08-01 ENCOUNTER — HEALTH MAINTENANCE LETTER (OUTPATIENT)
Age: 23
End: 2018-08-01

## 2018-08-03 ENCOUNTER — MEDICAL CORRESPONDENCE (OUTPATIENT)
Dept: TRANSPLANT | Facility: CLINIC | Age: 23
End: 2018-08-03

## 2018-08-14 ENCOUNTER — TELEPHONE (OUTPATIENT)
Dept: TRANSPLANT | Facility: CLINIC | Age: 23
End: 2018-08-14

## 2018-08-14 NOTE — TELEPHONE ENCOUNTER
Kinjal call today and I returned her call.  She wanted to know if I received her physical.  I let her know I had.  She has lost 10 lbs.  She is going to see the  Tomorrow regarding her EGD.  I will send her orders so that she can repeat her labs..  I will send them in the regular mail.

## 2018-08-21 ENCOUNTER — TELEPHONE (OUTPATIENT)
Dept: TRANSPLANT | Facility: CLINIC | Age: 23
End: 2018-08-21

## 2018-08-27 ENCOUNTER — TELEPHONE (OUTPATIENT)
Dept: TRANSPLANT | Facility: CLINIC | Age: 23
End: 2018-08-27

## 2018-08-27 NOTE — TELEPHONE ENCOUNTER
Oh and I m not sure if it s needed but they wanted me to be seen for my heartburn. I was seen the dr did not think I needed an EGD she took me off omeprazole and switched me over to famotidine.

## 2018-09-11 ENCOUNTER — TELEPHONE (OUTPATIENT)
Dept: TRANSPLANT | Facility: CLINIC | Age: 23
End: 2018-09-11

## 2018-09-11 NOTE — TELEPHONE ENCOUNTER
Sounds good.  I am going to present your case tomorrow at the kidney donor meeting.    From: Kinjal Roberts [[email or web address]]   Sent: Tuesday, September 11, 2018 8:47 AM  To: Jazmin Madrid  Subject: Re: [email or web address]    I reached out to my general doctor in [place] about my alkaline level still being high. He said it s not a concern right now and he s gonna check it at my annual.   Just wanted to update you.

## 2018-09-12 ENCOUNTER — COMMITTEE REVIEW (OUTPATIENT)
Dept: TRANSPLANT | Facility: CLINIC | Age: 23
End: 2018-09-12

## 2018-09-12 ENCOUNTER — RESULTS ONLY (OUTPATIENT)
Dept: OTHER | Facility: CLINIC | Age: 23
End: 2018-09-12

## 2018-09-12 NOTE — COMMITTEE REVIEW
Abdominal Committee Review Note     Evaluation Date:   Committee Review Date: 9/12/2018    Organ being evaluated for: Kidney    Transplant Phase:   Transplant Status:     Transplant Coordinator: Jazmin Madrid  Transplant Surgeon:       Referring Physician: Referred Self    Primary Diagnosis:   Secondary Diagnosis:     Committee Review Members:  Chino Vigil, RD   Pharmacy [facility], [facility]    - Clinical Leida Brooks, [facility], Pepper Castillo, [facility]   Transplant Rita Valdivia, RN, Jazmin Madrid, DOMINGO, Malu Lee, DOMINGO, Marta Mora MD, Alvin Mireles MD       Transplant Eligibility: split function and Cristiane Ku    Committee Review Decision:     Relative Contraindications: None    Absolute Contraindications:     Committee Chair Marta Mora MD verbally attested to the committee's decision.    Committee Discussion Details:      Reviewed Kinjal's abnormal evaluation findings:        Also of note:   Labs ok,  Weight loss ok, UA ok,  Have report of hysterectmy pathology.  Have reports of echo  And stress test.  All in EPIC.       CT Reviewed and decision to use will review with Dr. GARCIA on 9-13 kidney for donation.  Previously Left kidney was ok     Next Steps:  Do split function renogram and see Dr.. Ku  Call Kinjal and let her know the plan.  Virtual crossmatch pending.

## 2018-09-14 ENCOUNTER — COMMITTEE REVIEW (OUTPATIENT)
Dept: TRANSPLANT | Facility: CLINIC | Age: 23
End: 2018-09-14

## 2018-09-14 ENCOUNTER — TELEPHONE (OUTPATIENT)
Dept: TRANSPLANT | Facility: CLINIC | Age: 23
End: 2018-09-14

## 2018-09-14 DIAGNOSIS — Z00.5 TRANSPLANT DONOR EVALUATION: Primary | ICD-10-CM

## 2018-09-14 NOTE — TELEPHONE ENCOUNTER
I called korey to let her know that the last 2 pieces of her eval are the split function renogram and DR. Ku.  I will ask Meme to call Korey and schedule at Korey's convince.

## 2018-09-14 NOTE — COMMITTEE REVIEW
Abdominal Patient Discussion Note Transplant Coordinator: No matching coordinators  Transplant Surgeon:       Referring Physician: Referred Self    Committee Review Members:  Transplant Rita Valdivia, RN, Jazmin Madrid, DOMINGO, Malu Lee, DOMINGO       Additional Discussion Notes and Findings: Right kidney suggested by Dr. Garcia.  Kinjal needs split function and DR. Ku.  Need virtual crossmatch results.

## 2018-09-18 LAB
A* LOCUS NMDP: NORMAL
A* LOCUS: NORMAL
A* NMDP: NORMAL
A*: NORMAL
ABTEST METHOD: NORMAL
B* LOCUS NMDP: NORMAL
B* LOCUS: NORMAL
B* NMDP: NORMAL
B*: NORMAL
BW-1: NORMAL
C* LOCUS NMDP: NORMAL
C* LOCUS: NORMAL
C* NMDP: NORMAL
C*: NORMAL
DPA1* NMDP: NORMAL
DPA1*: NORMAL
DPB1* LOCUS NMDP: NORMAL
DPB1* NMDP: NORMAL
DPB1*: NORMAL
DPB1*LOCUS: NORMAL
DQA1*: NORMAL
DQA1*LOCUS: NORMAL
DQA1*NMDP: NORMAL
DQB1* LOCUS NMDP: NORMAL
DQB1* LOCUS: NORMAL
DQB1* NMDP: NORMAL
DQB1*: NORMAL
DRB1* LOCUS NMDP: NORMAL
DRB1* LOCUS: NORMAL
DRB1* NMDP: NORMAL
DRB1*: NORMAL
DRB4* LOCUS NMDP: NORMAL
DRB4* LOCUS: NORMAL
DRB4* NMDP: NORMAL
DRSSO TEST METHOD: NORMAL

## 2018-09-20 DIAGNOSIS — Z00.5 TRANSPLANT DONOR EVALUATION: Primary | ICD-10-CM

## 2018-09-28 ENCOUNTER — HOSPITAL ENCOUNTER (OUTPATIENT)
Dept: NUCLEAR MEDICINE | Facility: CLINIC | Age: 23
Setting detail: NUCLEAR MEDICINE
Discharge: HOME | End: 2018-09-28
Attending: INTERNAL MEDICINE | Admitting: INTERNAL MEDICINE

## 2018-09-28 ENCOUNTER — OFFICE VISIT (OUTPATIENT)
Dept: NEUROPSYCHOLOGY | Facility: CLINIC | Age: 23
End: 2018-09-28
Payer: COMMERCIAL

## 2018-09-28 DIAGNOSIS — Z00.5 TRANSPLANT DONOR EVALUATION: ICD-10-CM

## 2018-09-28 DIAGNOSIS — Z01.818 ENCOUNTER FOR PREOPERATIVE EVALUATION OF LIVING DONOR OF KIDNEY: ICD-10-CM

## 2018-09-28 DIAGNOSIS — Z13.31 SCREENING FOR DEPRESSION: Primary | ICD-10-CM

## 2018-09-28 PROCEDURE — 34300033 ZZH RX 343: Performed by: INTERNAL MEDICINE

## 2018-09-28 PROCEDURE — 78707 K FLOW/FUNCT IMAGE W/O DRUG: CPT

## 2018-09-28 PROCEDURE — A9562 TC99M MERTIATIDE: HCPCS | Performed by: INTERNAL MEDICINE

## 2018-09-28 RX ADMIN — Medication 9.8 MILLICURIE: at 13:41

## 2018-09-28 NOTE — PROGRESS NOTES
Service Date: 09/28/2018      PSYCHOLOGICAL EVALUATION      RELEVANT HISTORY AND REASON FOR REFERRAL:  Kinjal Crain is a 23-year-old woman interested in donating a kidney to a virtual stranger she learned about via Facebook.  This psychological evaluation is requested by Dr. Geovany Russell, on behalf of the donor team, to assess maturity, mental health and cognition as it relates to her ability to make a sound decision about organ donation.      EVALUATION FINDINGS:  Ms. Crain arrived early for the appointment, presenting as a somewhat heavyset young woman with long, dyed hair pulled back, wearing eyeglasses and a small nose stud, dressed in a T-shirt, jeans and sneakers.  Mood was euthymic, affect appropriate.  Thoughts were conveyed clearly and logically and she seemed guileless throughout the interaction.      Five to six months ago she saw a Facebook page from the recipient, a 25-year-old woman, soliciting a kidney.  Within a couple days she decided to pursue donation, and contacted our [facility]plant [facility] for more information, after discussing it with her long-term partner, Rama Dent.  Her mate was initially a little apprehensive, but is now fully supportive, having satisfied herself with information that the procedure is relatively safe for Kinjal.  Ms. Crain later contacted the recipient, April, 2-3 weeks later with the news.  The recipient expressed much appreciation and excitement.      Ms. Crain is altruistic by nature.  She has been a blood donor in the past, but gave that up after having a bad reaction to a donation (vomiting).  For the last two years she's been on a bone marrow registry through [facility] [employer] [facility] and another organization.  In the past, she volunteered with [employer].      She understands the risk of surgery includes the remote possibility of death, inadvertent injury to other organs, blood clots, breathing difficulties, infections, etc.  She knows this could  prolong recovery or even leave her with new health problems.  She has a general understanding that most kidney diseases affect both kidneys, but we discussed the possibility of losing the remaining kidney to a disease such as cancer or a traumatic injury.  This has not influenced her decision, as philosophically she believes whatever happens is meant to be, and she is not intimidated by low probability possibilities.  She understands obesity could put an added strain on the remaining kidney, so she plans to endeavor to keep her weight in check (though she appears overweight at this time).      Aside from migraines, she enjoys good health.  The migraines were much worse during adolescence and have substantially improved with medications, now occurring maybe once a month.  Neurological history is otherwise negative for head traumas, seizures, stroke symptoms, central nervous system viruses or infections, or other diseases of the brain.  Psychiatric history is completely unremarkable.  She is not prone to depression or nervousness and has not sought or felt the need for mental health treatment of any kind, including psychotropic medications.      She had painful menstrual cycles with heavy bleeding and needed several D&Cs before finally undergoing hysterectomy for polyps/cysts.  She still has her ovaries.  Other surgeries include ovarian cyst resection, wisdom tooth extractions, and tonsillectomy.      Regarding habits, she has never been a tobacco or recreational drug user, never even trying any of those substances.  Alcohol intake is minimal, usually limited to special occasions.      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  She has been at that full-time job for 14 months, and has benefits including health insurance, short-term disability and FMLA.  She has discussed her donation plans with her supervisor, and doesn't anticipate any difficulty taking off for  this surgery, nor does she think her job would be in jeopardy.  Our  is helping her with some of the transplant expenses, such as hotel stays and gasoline.  Ms. Crain would prefer to make the donation within the next 2-3 months.  She has stayed in touch with the recipient via Facebook, and they would both like to stay in touch after the transplant as well.  She has not met April in person, and does not even know where she lives, but assumes it is somewhere in [place].      She was born in [place], [place] and grew up elsewhere in that [facility], reared by her mother and stepfather.  Her biological parents were not , and her biological father, who now lives in [place], wasn't involved in her upbringing. Her mother has been with her stepfather since she was [age], and he ultimately adopted her, and raised her along with their two children (Ms. Crain's younger half-brother and half-sister).  Her father works as a , her mother was a CNA prior to a back injury and now holds a part-time factory job.  Ms. Crain hated math but finished high school without significant learning difficulties, plus a little college.  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 She and Rama met in high school and have been a couple since then.  Rama has an outdoor job locating utilities.      CONCLUSIONS AND RECOMMENDATIONS:  This kind 23-year-old woman has been a blood donor and is on the bone marrow donor registry, and learned about the recipient's plight via a Facebook posting.  She does not know the recipient, but they've had Facebook exchanges over the last several months, and hope to get to know each other following transplant.  Ms. Crain is knowledgeable about the surgical risks and the potential problems of having a single kidney, should she lose the remaining organ to injury or disease.  Family members and her partner  of many years are supportive.  She has broached the issue with her employer, and does not anticipate any trouble getting time off.  She has some paid benefits and FMLA.      Ms. Powell appears to be of normal intelligence and I detect no signs of mental illness or any other issues that might cloud judgment.  She seems motivated out of the goodness of her heart and is not expecting anything tangible in exchange for this donation.  In my view, there are no psychosocial contraindications to organ donation.        Gaye Ku PsyD   Licensed Psychologist         DIAGNOSTIC IMPRESSION:  Screening for depression and other mental illnesses and kidney donor evaluation. This assessment included a 1-hour diagnostic interview (CPT code 90092).         ANANDA HERNANDEZ             D: 2018   T: 2018   MT: jamaal      Name:     BAUDILIO POWELL   MRN:      [id number]        Account:      [id number]   :      1995           Service Date: 2018      Document: [id number]

## 2018-09-28 NOTE — MR AVS SNAPSHOT
After Visit Summary   9/28/2018    Kinjal Crain    MRN: [id number]           Patient Information     Date Of Birth          1995        Visit Information        Provider Department      9/28/2018 9:00 AM Gaye Ku PsyD [facility]        Today's Diagnoses     Screening for depression    -  1    Encounter for preoperative evaluation of living donor of kidney           Follow-ups after your visit        Who to contact     Please call your clinic at [phone] to:    Ask questions about your health    Make or cancel appointments    Discuss your medicines    Learn about your test results    Speak to your doctor            Additional Information About Your Visit        MyChart Information     T[employer] gives you secure access to your electronic health record. If you see a primary care provider, you can also send messages to your care team and make appointments. If you have questions, please call your primary care clinic.  If you do not have a primary care provider, please call [phone] and they will assist you.      TicketForEvent is an electronic gateway that provides easy, online access to your medical records. With TicketForEvent, you can request a clinic appointment, read your test results, renew a prescription or communicate with your care team.     To access your existing account, please contact your [facility] or call [phone] for assistance.        Care EveryWhere ID     This is your Care EveryWhere ID. This could be used by other organizations to access your [place] medical records  [id number]         Blood Pressure from Last 3 Encounters:   07/20/18 128/85    Weight from Last 3 Encounters:   07/20/18 79.4 kg (175 lb 0.7 oz)   07/20/18 79.4 kg (175 lb 1.6 oz)              We Performed the Following     C PSYCHIATRIC DIAGNOSTIC EVALUATION        Primary Care Provider Fax #    Physician No Ref-Primary [phone]       No address on file         Equal Access to Services     [facility] : Hadii aad ku hadyefrithalia Marieronaldo, wadianeda luqadaha, qaybta alejaneydaluann thompson. So [facility] [phone].    ATENCIÓN: Si habla español, tiene a medina disposición servicios gratuitos de asistencia lingüística. Llame al [phone].    We comply with applicable federal civil rights laws and [place] laws. We do not discriminate on the basis of race, color, national origin, age, disability, sex, sexual orientation, or gender identity.            Thank you!     Thank you for choosing [facility]UR[facility]  for your care. Our goal is always to provide you with excellent care. Hearing back from our patients is one way we can continue to improve our services. Please take a few minutes to complete the written survey that you may receive in the mail after your visit with us. Thank you!             Your Updated Medication List - Protect others around you: Learn how to safely use, store and throw away your medicines at www.disposemymeds.org.          This list is accurate as of 9/28/18 11:59 PM.  Always use your most recent med list.                   Brand Name Dispense Instructions for use Diagnosis    cetirizine 10 MG tablet    zyrTEC     Take 10 mg by mouth daily        omeprazole 20 MG CR capsule    priLOSEC     Take 20 mg by mouth daily        topiramate 50 MG tablet    TOPAMAX     Take 25 mg by mouth 2 times daily

## 2018-10-03 ENCOUNTER — DOCUMENTATION ONLY (OUTPATIENT)
Dept: PHARMACY | Facility: CLINIC | Age: 23
End: 2018-10-03

## 2018-10-03 ENCOUNTER — COMMITTEE REVIEW (OUTPATIENT)
Dept: TRANSPLANT | Facility: CLINIC | Age: 23
End: 2018-10-03

## 2018-10-03 ENCOUNTER — TELEPHONE (OUTPATIENT)
Dept: TRANSPLANT | Facility: CLINIC | Age: 23
End: 2018-10-03

## 2018-10-03 NOTE — COMMITTEE REVIEW
Abdominal Committee Review Note     Evaluation Date:   Committee Review Date: 10/3/2018    Organ being evaluated for: Kidney    Transplant Phase:   Transplant Status:     Transplant Coordinator: Jazmin Madrid  Transplant Surgeon:       Referring Physician: Referred Self    Primary Diagnosis:   Secondary Diagnosis:     Committee Review Members:  Dietitian, Lani Love RD   Nephrology Eugene Matta MD, Enrique Cardona MD   Pharmacy Lucas Acosta, [facility]    - Clinical Leida Brooks, [facility]   Transplant Rita Valdivia, RN, Jazmin Madrid, RN, Geovany Russell MD, Anastasia Lundberg LPN, Malu Lee RN       Transplant Eligibility: Acceptable Physical Health, Acceptable Mental Health    Committee Review Decision: Approved    Relative Contraindications: None    Absolute Contraindications: None    Committee Chair Geovany Russell MD verbally attested to the committee's decision.    Committee Discussion Details:      Reviewed Kinjal's abnormal evaluation findings:     Gerd:  Team wanted Kinjal to talk with her DrVasquez About another EDG.  It was recommended that she not have another EGD but change medication from Prilosec to Pepcid for GERD.  This was done  Alk phos re checked-ok  Triglycerides re checked-ok  Uric acid rechecked-ok  UA rechecked-ok  BMI now 28.  Had recommended wt loss of 10 lbs  Pathology results of hysterectomy in [facility]  Records of previous echo in Epic  Split function -ok-Choice  Cristiane Kings-ok     CT Reviewed and decision to use Choice kidney for donation.       Next Steps: Wait for virtual crossmatch review.

## 2018-10-03 NOTE — TELEPHONE ENCOUNTER
I called Kinjal to let her know she is an approved donor.   Waiting for immunology to weigh in on direct or Pep.  Review will be 10-10-18

## 2018-10-03 NOTE — PHARMACY-MEDICATION REGIMEN REVIEW
Pharmacy Living Kidney Donor Medication Evaluation     This patient is a 23 year old female being considered for living kidney donation. As part of the kidney pre-donation patient evaluation, pharmacy has screened this patient's electronic medical record for medication related concerns.    Assessment / Plan    No significant potential medication related issues are expected for this patient post surgery, based on the medical record medication list review.  Pharmacy will continue to participate in this patient's care throughout the surgery course.  Please contact pharmacy with any further medication related questions or concerns.       LIZETTE Wright.Ph.  [facility]  [phone] pager

## 2018-10-17 ENCOUNTER — TELEPHONE (OUTPATIENT)
Dept: TRANSPLANT | Facility: CLINIC | Age: 23
End: 2018-10-17

## 2018-10-17 NOTE — TELEPHONE ENCOUNTER
Called Kinjal and let her know we need an interim crossmatch.  I will send kit and orders and she will have this blood done next Tuesday.  I asked recipient team to get blood for recipient for crossmatch also.

## 2018-11-06 ENCOUNTER — TELEPHONE (OUTPATIENT)
Dept: TRANSPLANT | Facility: CLINIC | Age: 23
End: 2018-11-06

## 2018-11-06 NOTE — TELEPHONE ENCOUNTER
Not sure.  The only issue I know of is the decision by the immunology team about whether you can be a direct donor or enter paired exchange.  If April received a email from her coordinator regarding issues I am thinking she should clarify. I am on vacation until next Tues.  Hopefully when I return we will have  good plan.  Take care.  Jazmin    From: Kinjal Crain [[email or web address]]   Sent: Tuesday, November 06, 2018 10:40 AM  To: Jazmin Madrid  Cc: Malu Lee  Subject: Re: [email or web address].pdf    Awesome! April mentioned she got an email stating there was issues? Wondering what that might be.

## 2018-11-13 ENCOUNTER — TELEPHONE (OUTPATIENT)
Dept: TRANSPLANT | Facility: CLINIC | Age: 23
End: 2018-11-13

## 2018-11-13 NOTE — TELEPHONE ENCOUNTER
Called Kinjal and let her know that IKTP team recommended PEP for her donation.   She was ok with this.  We will send the consents and Malu will connect with her.

## 2019-01-24 ENCOUNTER — TELEPHONE (OUTPATIENT)
Dept: TRANSPLANT | Facility: CLINIC | Age: 24
End: 2019-01-24

## 2019-01-29 ENCOUNTER — TELEPHONE (OUTPATIENT)
Dept: TRANSPLANT | Facility: CLINIC | Age: 24
End: 2019-01-29

## 2019-01-29 NOTE — TELEPHONE ENCOUNTER
Received email from Kinjal requesting information on assistance with lost wages through [employer].  I sent her the following email:    There is a wage reimbursement program through [facility], which is the program that your coordinator, Malu, is using to connect you with a recipient.  This program is set up through the [facility] Care Network (DCN) with the goal of ensuring that financial burdens due to kidney donation are minimized. Donors that make $62,000 or less per year may be eligible for reimbursement of up to 4 weeks of lost wages, with reimbursement for weeks 3 & 4 requiring clearance from the transplant center. Lost wages are defined and limited to documented expected earnings that a donor lost due to time missed at work as a result of the kidney donation  Is your annual income is less than $62,000.00?  If so, please let me know, and also please apply for the wage reimbursement program.  To do so, go to  [email or web address]  and use the salmon-colored button that says  click here to see if you re qualified to donate This will get you registered into [facility] s system so that you can participate in their lost wage reimbursement program.  This is something that you need to do (I can t do it for you!).   Also, please e-mail me a copy of your 2017 Federal Income Tax Return and a copy of your pay stub.  I am cc ing my colleague, Pepper Castillo, who can also assist with this process.  This is a newer program, so let me know if you run into problems.  Thanks

## 2019-03-07 ENCOUNTER — TELEPHONE (OUTPATIENT)
Dept: TRANSPLANT | Facility: CLINIC | Age: 24
End: 2019-03-07

## 2019-03-07 NOTE — LETTER
REIMBURSEMENT INFORMATION FOR LIVING ORGAN DONORS    LIVING ORGAN DONOR: This form MUST accompany & remain attached to Orders &  given to Provider and/or Healthcare Facility Business Office    PROVIDER/FACILITY INSTRUCTIONS: By accepting to perform these services for living organ  donation, the provider/facility agrees to exclusively bill the [facility] instead of billing  the patient or any insurance provider and agrees to accept the reimbursement, as described below, as  payment in full for services rendered.    PROVIDER BILLING INSTRUCTIONS:  1. [facility] agrees to pay for all authorized testing ordered by our transplant  program that is related to living organ donation. The attached orders/tests are part of the donor  Evaluation.    2. Do not bill the donor or donor's insurance. Send an itemized invoice, claim or statement to:    [facility]  Transplant Finance/Donor Billing  [address] [place] [address]    3. Billing statements must include the patient first and last name, date of birth, the CPT procedure code  and date of service. Please bill service on the ORIGINAL UBO4 or 1500 with appropriate CPT/HCPCS  codes along with W-9 and send to the above address to insure timely reimbursement.    4. Claims should be submitted no later than six months from the date when services are rendered.  Claims denied for late submission should not be billed to the donor or their private insurance carrier.    5. [facility] will reimburse all charges at 100% of the Medicare Fee Schedule as  defined in the Code of Federal Regulations (CFR) 42, Chapter IV. This is to be considered payment  in full. [facility], the patient, and/or the patient's insurance are NOT to  be billed any balance, co-payment, or deductible, per Medicare regulations. **ATTN: Facility  providing services for attached/enclosed Living  Donor Orders; If facility does NOT AGREE to  the reimbursement rate stated above, PLEASE DENY SERVICES & refer Donor/patient back to  their [facility] Transplant Center.    6. Patients are NOT to make any payments at the time of service.    Please forward this information to your billing department so that a donor account can be set up with  these instructions.    Should you have any questions, please contact the Donor Billing office at [phone],  Monday - Friday, 8:00 a.m. to 4:00 p.m.   Thank you for your assistance.

## 2019-03-07 NOTE — LETTER
PHYSICIAN ORDER   National Kidney Registry Cryo Program  [Donor]    DATE & TIME ISSUED: 3/7/2019 15:21  PATIENT NAME:  Kinjal Crain  :    1995  MR# [if applicable]:    [id number]  DIAGNOSIS:   Potential Organ Donor  ICD-10 CODE:  Z00.5  EXPIRES: (1 YEAR AFTER DATE ISSUED)    1. Please draw 8  yellow top (acid-citrate-dextrose)10cc tubes  Blood should be sent via FedEx overnight to arrive on a Tuesday, Wednesday, or Thursday. Do NOT draw on Thursday or Friday.     2. Mail blood to:   [facility], [address]  [place], [place] [address] [phone]    3.  Send Billing to The Transplant Center  [facility] Mail Code [phone] [address] [address]        Geovany Russell MD  Surgical Director, Kidney Transplantation

## 2019-03-07 NOTE — TELEPHONE ENCOUNTER
I called the patient to review that we are still holding due to recipient is recovering from a surgery.  We will be able to activate once recipient is cleared by surgeon.  In the mean time I will work on sending her the blood kit for cryo and send orders to [facility].  She stated she is still willing to be donor and she herself does not have any timing issues. I reviewed lab draw process for the cryo specimen for [facility].

## 2019-04-18 ENCOUNTER — TELEPHONE (OUTPATIENT)
Dept: TRANSPLANT | Facility: CLINIC | Age: 24
End: 2019-04-18

## 2019-04-18 NOTE — TELEPHONE ENCOUNTER
I called the patient to let her know that we are now active in [facility].  I wanted to be sure she was aware that the recipient was cleared.  The patient stated that is good news and that she is ready to be a donor if needed.

## 2019-04-26 ENCOUNTER — TELEPHONE (OUTPATIENT)
Dept: TRANSPLANT | Facility: CLINIC | Age: 24
End: 2019-04-26

## 2019-04-26 NOTE — TELEPHONE ENCOUNTER
I have had contact with Kinjal this week via email exchanges.  She has stated she would like to continue being a donor despite her intended recipient receiving a  donor transplant earlier this week.    Her timeline is very important for her the preferred time would be done by end of May, prefer the week of May 20.  Her family has a camping trip later in the summer and she wants to be healed for that.  She could come to clinic the Mon/Tues May 13/14 for her preop.  She is out of town on , and .    I reviewed that since she saw Dr Ku as part of her workup she does not require any further supplemental testing to qualify to be a Non Directed Donor.  I will ask the Donor Social work team to contact her and review options for wage reimbursement via [facility] and the [facility] filing.  [place] hours [place].  I will have her listing finalized next week by asking [facility] to switch her to a NDD in their system.

## 2019-05-06 DIAGNOSIS — Z00.5 EXAMINATION OF POTENTIAL DONOR OF ORGAN AND TISSUE: Primary | ICD-10-CM

## 2019-05-07 ENCOUNTER — TELEPHONE (OUTPATIENT)
Dept: TRANSPLANT | Facility: CLINIC | Age: 24
End: 2019-05-07

## 2019-05-07 DIAGNOSIS — Z00.5 EXAMINATION OF POTENTIAL DONOR OF ORGAN AND TISSUE: ICD-10-CM

## 2019-05-07 DIAGNOSIS — Z00.5 EXAMINATION OF POTENTIAL DONOR OF ORGAN AND TISSUE: Primary | ICD-10-CM

## 2019-05-08 ENCOUNTER — TELEPHONE (OUTPATIENT)
Dept: TRANSPLANT | Facility: CLINIC | Age: 24
End: 2019-05-08

## 2019-05-08 NOTE — TELEPHONE ENCOUNTER
D/I:  Phone conversation with Kinjal today to discuss donor travel expenses.  She will make hotel reservations at [facility] from Monday, May 20 through Friday, May 24.  Once she provides me with the confirmation number, I will send that to R (Charly) for payment.  I sent her Holiday gas cards to assist with gas expenses getting to/from [place].  A:  Arrangement in process to assist Kinjal with donor related travel expenses.   P:  I will coordinate with [facility] wage and travel reimbursement program and assist with any further concerns, as they arise.

## 2019-05-09 ENCOUNTER — TELEPHONE (OUTPATIENT)
Dept: TRANSPLANT | Facility: CLINIC | Age: 24
End: 2019-05-09

## 2019-05-09 DIAGNOSIS — Z00.5 EXAMINATION OF POTENTIAL DONOR OF ORGAN AND TISSUE: Primary | ICD-10-CM

## 2019-05-09 NOTE — TELEPHONE ENCOUNTER
I called the patient to let her know that her chain has been reoffered to another institution.  The previous recipient was too ill to proceed.  The surgery date is still May 21.  She confirmed that date continues to work very well for her.  I reviewed a new blood kit is being shipped to her home for final XM  She should use that on MOnday for the draw.  Along with the other kit for infectious disease.  In addition we will be performing Quant TB at her local lab.  I have faxed the order to [facility]: quant TB in house; kit for final XM, kit for serology panel. And a billing sheet.  Message to Donor Social Work team that the identity of her recipient matched in [facility] is now changed.

## 2019-05-13 ENCOUNTER — TRANSFERRED RECORDS (OUTPATIENT)
Dept: HEALTH INFORMATION MANAGEMENT | Facility: CLINIC | Age: 24
End: 2019-05-13

## 2019-05-14 DIAGNOSIS — Z00.5 EXAMINATION OF POTENTIAL DONOR OF ORGAN AND TISSUE: Primary | ICD-10-CM

## 2019-05-15 ENCOUNTER — TELEPHONE (OUTPATIENT)
Dept: TRANSPLANT | Facility: CLINIC | Age: 24
End: 2019-05-15

## 2019-05-15 DIAGNOSIS — Z00.5 EXAMINATION OF POTENTIAL DONOR OF ORGAN AND TISSUE: ICD-10-CM

## 2019-05-15 NOTE — TELEPHONE ENCOUNTER
Phone call to Kinjal today to confirm hotel reservations at [employer] - Confirmation # [id number].  She understands that [facility] will assist with hotel payment.  Gas cards were provided to assist with travel expenses to transplant center.  No new questions or concerns.  She knows how to contact me should she have any further psychosocial/advocacy needs.

## 2019-05-20 ENCOUNTER — ALLIED HEALTH/NURSE VISIT (OUTPATIENT)
Dept: TRANSPLANT | Facility: CLINIC | Age: 24
End: 2019-05-20
Attending: SURGERY

## 2019-05-20 ENCOUNTER — APPOINTMENT (OUTPATIENT)
Dept: TRANSPLANT | Facility: CLINIC | Age: 24
End: 2019-05-20
Attending: SURGERY

## 2019-05-20 ENCOUNTER — OFFICE VISIT (OUTPATIENT)
Dept: TRANSPLANT | Facility: CLINIC | Age: 24
End: 2019-05-20
Attending: SURGERY

## 2019-05-20 ENCOUNTER — ANESTHESIA EVENT (OUTPATIENT)
Dept: SURGERY | Facility: CLINIC | Age: 24
DRG: 661 | End: 2019-05-20

## 2019-05-20 ENCOUNTER — OFFICE VISIT (OUTPATIENT)
Dept: TRANSPLANT | Facility: CLINIC | Age: 24
End: 2019-05-20
Attending: NURSE PRACTITIONER

## 2019-05-20 VITALS
HEART RATE: 110 BPM | WEIGHT: 181 LBS | DIASTOLIC BLOOD PRESSURE: 87 MMHG | BODY MASS INDEX: 30.12 KG/M2 | SYSTOLIC BLOOD PRESSURE: 126 MMHG | OXYGEN SATURATION: 98 %

## 2019-05-20 DIAGNOSIS — Z00.5 EXAMINATION OF POTENTIAL DONOR OF ORGAN AND TISSUE: Primary | ICD-10-CM

## 2019-05-20 DIAGNOSIS — Z00.5 TRANSPLANT DONOR EVALUATION: Primary | ICD-10-CM

## 2019-05-20 DIAGNOSIS — Z00.5 EXAMINATION OF POTENTIAL DONOR OF ORGAN AND TISSUE: ICD-10-CM

## 2019-05-20 LAB
ABO + RH BLD: NORMAL
ABO + RH BLD: NORMAL
ALBUMIN UR-MCNC: NEGATIVE MG/DL
APPEARANCE UR: CLEAR
B-HCG SERPL-ACNC: <1 IU/L (ref 0–5)
BILIRUB UR QL STRIP: NEGATIVE
BLD GP AB SCN SERPL QL: NORMAL
BLOOD BANK CMNT PATIENT-IMP: NORMAL
COLOR UR AUTO: NORMAL
CREAT SERPL-MCNC: 0.7 MG/DL (ref 0.52–1.04)
GFR SERPL CREATININE-BSD FRML MDRD: >90 ML/MIN/{1.73_M2}
GLUCOSE UR STRIP-MCNC: NEGATIVE MG/DL
HGB BLD-MCNC: 15.2 G/DL (ref 11.7–15.7)
HGB UR QL STRIP: NEGATIVE
KETONES UR STRIP-MCNC: NEGATIVE MG/DL
LEUKOCYTE ESTERASE UR QL STRIP: NEGATIVE
NITRATE UR QL: NEGATIVE
PH UR STRIP: 7 PH (ref 5–7)
RBC #/AREA URNS AUTO: 0 /HPF (ref 0–2)
SOURCE: NORMAL
SP GR UR STRIP: 1.01 (ref 1–1.03)
SPECIMEN EXP DATE BLD: NORMAL
SQUAMOUS #/AREA URNS AUTO: 1 /HPF (ref 0–1)
UROBILINOGEN UR STRIP-MCNC: 0 MG/DL (ref 0–2)
WBC #/AREA URNS AUTO: 0 /HPF (ref 0–5)

## 2019-05-20 PROCEDURE — 85018 HEMOGLOBIN: CPT | Performed by: SURGERY

## 2019-05-20 PROCEDURE — G0463 HOSPITAL OUTPT CLINIC VISIT: HCPCS | Mod: ZF

## 2019-05-20 PROCEDURE — 82565 ASSAY OF CREATININE: CPT | Performed by: SURGERY

## 2019-05-20 PROCEDURE — 36415 COLL VENOUS BLD VENIPUNCTURE: CPT | Performed by: SURGERY

## 2019-05-20 PROCEDURE — 86900 BLOOD TYPING SEROLOGIC ABO: CPT | Performed by: SURGERY

## 2019-05-20 PROCEDURE — 84702 CHORIONIC GONADOTROPIN TEST: CPT | Performed by: SURGERY

## 2019-05-20 PROCEDURE — 81001 URINALYSIS AUTO W/SCOPE: CPT | Performed by: SURGERY

## 2019-05-20 PROCEDURE — 86901 BLOOD TYPING SEROLOGIC RH(D): CPT | Performed by: SURGERY

## 2019-05-20 PROCEDURE — 86850 RBC ANTIBODY SCREEN: CPT | Performed by: SURGERY

## 2019-05-20 RX ORDER — DULOXETIN HYDROCHLORIDE 20 MG/1
20 CAPSULE, DELAYED RELEASE ORAL DAILY
COMMUNITY

## 2019-05-20 RX ORDER — ONDANSETRON 2 MG/ML
4 INJECTION INTRAMUSCULAR; INTRAVENOUS ONCE
Status: CANCELLED | OUTPATIENT
Start: 2019-05-20 | End: 2019-05-20

## 2019-05-20 NOTE — LETTER
5/20/2019      RE: Kinjal Crain  [address] [facility] [address]       Chief Complaint   Patient presents with     Transplant Organ Donation Admission     Day minus 1     Blood pressure 126/87, pulse 110, weight 82.1 kg (181 lb), SpO2 98 %.    Jacy Gilliam [facility]      Transplant Surgery H&P                                                        HPI:                                                      Ms. Crain is a 23 year old female who comes to clinic today for preop prior to planned laparoscopic kidney donation surgery. The patient was previously reviewed by the living donor multidisciplinary selection committee and found to be medically and psychosocially appropriate for voluntary kidney donation. The patient denies any feelings of being pressured to proceed with kidney donation.  Health events since donor evaluation: None    Special considerations:   Constipation: no  PONV: yes  History of Urinary retention? no  Significant neck/back/joint concerns for lateral decubitus positioning?: No  Zoroastrianism: No    MEDICAL HISTORY:                                                      Patient Active Problem List    Diagnosis Date Noted     Transplant donor evaluation 07/13/2018     Priority: Medium      Past Medical History:   Diagnosis Date     DUB (dysfunctional uterine bleeding)     s/p hysterectomy      GERD (gastroesophageal reflux disease)      Migraine     botox     Tachycardia      Past Surgical History:   Procedure Laterality Date     HYSTERECTOMY       TONSILLECTOMY       Current Outpatient Medications   Medication Sig Dispense Refill     cetirizine (ZYRTEC) 10 MG tablet Take 10 mg by mouth daily       DULoxetine (CYMBALTA) 20 MG capsule Take 20 mg by mouth daily       omeprazole (PRILOSEC) 20 MG CR capsule Take 20 mg by mouth daily       topiramate (TOPAMAX) 50 MG tablet Take 25 mg by mouth 2 times daily       OTC products: None, except as noted above  Allergies   Allergen Reactions     Indocin  [Indomethacin]      Dizziness       Norco [Hydrocodone-Acetaminophen] Nausea and Vomiting     Penicillins Hives     Allergy when an infant     Latex Rash     Sumatriptan Palpitations      Social History     Tobacco Use     Smoking status: Never Smoker     Smokeless tobacco: Never Used   Substance Use Topics     Alcohol use: Yes     Comment: Occasional- 1-2x per year     History   Drug Use No       REVIEW OF SYSTEMS:                                                    CONSTITUTIONAL: NEGATIVE for fever, chills, change in weight  INTEGUMENTARY/SKIN: NEGATIVE for worrisome rashes, moles or lesions  EYES: NEGATIVE for vision changes or irritation  ENT/MOUTH: NEGATIVE for ear, mouth and throat problems  RESP: NEGATIVE for significant cough or SOB  BREAST: NEGATIVE for masses, tenderness or discharge  CV: NEGATIVE for chest pain, palpitations or peripheral edema  GI: NEGATIVE for nausea, abdominal pain, heartburn, or change in bowel habits  : NEGATIVE for frequency, dysuria, or hematuria  MUSCULOSKELETAL: NEGATIVE for significant arthralgias or myalgia  NEURO: NEGATIVE for weakness, dizziness or paresthesias  ENDOCRINE: NEGATIVE for temperature intolerance, skin/hair changes  HEME: NEGATIVE for bleeding problems  PSYCHIATRIC: NEGATIVE for changes in mood or affect    EXAM:                                                    /87   Pulse 110   Wt 82.1 kg (181 lb)   SpO2 98%   BMI 30.12 kg/m       GENERAL APPEARANCE: healthy, alert and no distress     EYES: EOMI, PERRL     HENT: ear canals and TM's normal and nose and mouth without ulcers or lesions     NECK: no adenopathy, no asymmetry, masses, or scars and thyroid normal to palpation     RESP: lungs clear to auscultation - no rales, rhonchi or wheezes     CV: regular rates and rhythm, normal S1 S2, no S3 or S4 and no murmur, click or rub     ABDOMEN:  soft, nontender, no HSM or masses and bowel sounds normal. Scars consistent with history.  Supraumbilical scar      MS: extremities normal- no gross deformities noted, no evidence of inflammation in joints, FROM in all extremities.     SKIN: no suspicious lesions or rashes     NEURO: Normal strength and tone, sensory exam grossly normal, mentation intact and speech normal     PSYCH: mentation appears normal. and affect normal/bright     LYMPHATICS: No cervical adenopathy    DIAGNOSTICS:                                                      EKG: appears normal, NSR, normal axis, normal intervals, no acute ST/T changes c/w ischemia, no LVH by voltage criteria, unchanged from previous tracings  Chest XRay  Labs Resulted Today:   Results for orders placed or performed in visit on 05/20/19   UA with Microscopic reflex to Culture   Result Value Ref Range    Color Urine Straw     Appearance Urine Clear     Glucose Urine Negative NEG^Negative mg/dL    Bilirubin Urine Negative NEG^Negative    Ketones Urine Negative NEG^Negative mg/dL    Specific Gravity Urine 1.008 1.003 - 1.035    Blood Urine Negative NEG^Negative    pH Urine 7.0 5.0 - 7.0 pH    Protein Albumin Urine Negative NEG^Negative mg/dL    Urobilinogen mg/dL 0.0 0.0 - 2.0 mg/dL    Nitrite Urine Negative NEG^Negative    Leukocyte Esterase Urine Negative NEG^Negative    Source Midstream Urine     WBC Urine 0 0 - 5 /HPF    RBC Urine 0 0 - 2 /HPF    Squamous Epithelial /HPF Urine 1 0 - 1 /HPF   HCG quantitative pregnancy   Result Value Ref Range    HCG Quantitative Serum <1 0 - 5 IU/L   Creatinine   Result Value Ref Range    Creatinine 0.70 0.52 - 1.04 mg/dL    GFR Estimate >90 >60 mL/min/[1.73_m2]    GFR Estimate If Black >90 >60 mL/min/[1.73_m2]   Hemoglobin   Result Value Ref Range    Hemoglobin 15.2 11.7 - 15.7 g/dL   ABO/Rh type and screen   Result Value Ref Range    ABO O     RH(D) Pos     Antibody Screen Neg     Test Valid Only At          [facility]    Specimen Expires 05/23/2019      Labs Drawn and in Process:   Unresulted Labs  Ordered in the Past 30 Days of this Admission     Date and Time Order Name Status Description    5/20/2019 0842 HLA FINAL CROSSMATCH DONOR In process         Recent Labs   Lab Test 05/20/19  1055 07/20/18  0735   HGB 15.2 14.0   PLT  --  344   INR  --  0.98   NA  --  138   POTASSIUM  --  3.8   CR 0.70 0.75   A1C  --  5.4      Assessment:                                                    Healthy voluntary kidney donor    There have been no significant intercurrent medical problems or change of intent in proceeding with kidney donation as scheduled on 5/21/2019.    1. Labs reviewed and within normal limits: Yes  2. EKG (7/20/2018): appears normal, NSR  3. Paired Exchange case: Yes  4. ABO= O  5. Laterality: right  6. Outstanding issues: Final ABO and cross match    Plan:                                                      1. Consent: Done  2. Outstanding issues: Final ABO and cross match     Signed Electronically by: Mable Sosa    Ms. Crain was seen and evaluated today as part of a shared APRN/PA visit.     I personally reviewed past medical and surgical history, vital signs, medications and labs, present and past medical history,and significant physical exam findings with the advanced practice provider.    My key findings include:  Labs and vital reviewed.  All WNL.  Exam benign    Key management decisions made by me and carried out under my direction include:  Consent obtained with full explanation of risk and benefits of surgery  All questions answered  Pt suitable for donation tomorrow    Britney Dial MD FACS    Britney Dial MD, MD

## 2019-05-20 NOTE — NURSING NOTE
Saw donor in clinic for his Pre-Op visit.  I reviewed procedure for the surgery day.  She will be donating as a Non direct donor in the National Kidney Registry Swap 1683 to a KPD recipient at the [facility].  Her partner was with her today in clinic.       I reviewed pain control medication that is typically prescribed including nerve block which Anesthesia will place preop.  I reviewed the ERAS protocol teaching sheet:  Encourage Activity.  Demonstrated and reviewed importance of Incentive Spirometry usage- I gave her a IS to bring home to practice and to bring to surgery.   I gave her two bottles of Ensure clear with instructions to drink at bedtime on 5/20 and when awakens on 5/21.  I reviewed NPO status begins at 05:30 on 5/21/19.  I gave her the milestones to discharge sheet and reviewed its content with the patient.  I reviewed arrival time of 05:00 to 3C and Start time of surgery 08:00.    Pt will meet with Surgeon and Nurse Practitioner today to develop plan for surgery.  I gave her a donor blanket, and gift bag.   I thanked her for all that she is doing to make this happen.  I reviewed that her bills will be paid for by insurance of the matched recipient.    I reviewed that she has a right to withdraw from kidney donation at any time, for any reason.      I reviewed post op plan of care including timing for office visit with surgeon and when labs are needed.  She will go home after discharge and then return to [facility] for his visit with surgeon .   We reviewed plan for blood draws post donation.   Communication with anonymous recipient: I stated she can mail a card to me and I will see that it gets to her recipient, I ask that she keep her identifiers off of the mailing.       Preop Preparations for KPD Event:  I assessed both the donor and recipient ABO blood types, UNOS ID and placed source documents in folder for the day of surgery ABO verification by the surgical team.      Final XM results  are acceptable copy received and reviewed with Dr Dial.  Serologies were performed at [facility] lab last week, results are acceptable.     Logistics call is completed. I have been given clearance to proceed from Financial and Transplant Managers.  The other centers in this exchange have met CMS guidelines for performing kidney transplant as shown on the CMS website.

## 2019-05-20 NOTE — PROGRESS NOTES
Chief Complaint   Patient presents with     Transplant Organ Donation Admission     Day minus 1     Blood pressure 126/87, pulse 110, weight 82.1 kg (181 lb), SpO2 98 %.    Jacy Gilliam, CMA

## 2019-05-20 NOTE — PROGRESS NOTES
Chief Complaint   Patient presents with     Transplant Admission     DM -1 Donor     See flowsheet for vitals.    Jacy Gilliam, CMA

## 2019-05-20 NOTE — LETTER
5/20/2019       RE: Kinjal Crain  [address] [facility] [address]     Dear Colleague,    Thank you for referring your patient, Kinjal Crain, to the [facility] SOLID ORGAN TRANSPLANT at [facility]. Please see a copy of my visit note below.    Chief Complaint   Patient presents with     Transplant Admission     DM -1 Donor     See flowsheet for vitals.    Jacy Gilliam CMA      See surgeon note     Again, thank you for allowing me to participate in the care of your patient.      Sincerely,    Mable Sosa NP

## 2019-05-20 NOTE — LETTER
Date:May 21, 2019      Patient was self referred, no letter generated. Do not send.        [facility] Information

## 2019-05-21 ENCOUNTER — HOSPITAL ENCOUNTER (INPATIENT)
Facility: CLINIC | Age: 24
LOS: 3 days | Discharge: HOME | DRG: 661 | End: 2019-05-24
Attending: SURGERY | Admitting: SURGERY
Payer: COMMERCIAL

## 2019-05-21 ENCOUNTER — ANESTHESIA (OUTPATIENT)
Dept: SURGERY | Facility: CLINIC | Age: 24
DRG: 661 | End: 2019-05-21

## 2019-05-21 DIAGNOSIS — Z52.4 ENCOUNTER FOR DONATION OF KIDNEY: ICD-10-CM

## 2019-05-21 DIAGNOSIS — Z00.5 TRANSPLANT DONOR EVALUATION: Primary | ICD-10-CM

## 2019-05-21 LAB
CK SERPL-CCNC: 104 U/L (ref 30–225)
GLUCOSE BLDC GLUCOMTR-MCNC: 105 MG/DL (ref 70–99)
HCT VFR BLD AUTO: 39.3 % (ref 35–47)
HGB BLD-MCNC: 12.9 G/DL (ref 11.7–15.7)
HLA FINAL CROSSMATCH DONOR: NORMAL

## 2019-05-21 PROCEDURE — 25000131 ZZH RX MED GY IP 250 OP 636 PS 637: Performed by: STUDENT IN AN ORGANIZED HEALTH CARE EDUCATION/TRAINING PROGRAM

## 2019-05-21 PROCEDURE — 25800030 ZZH RX IP 258 OP 636: Performed by: NURSE PRACTITIONER

## 2019-05-21 PROCEDURE — 25000132 ZZH RX MED GY IP 250 OP 250 PS 637: Performed by: NURSE PRACTITIONER

## 2019-05-21 PROCEDURE — 25000125 ZZHC RX 250: Performed by: NURSE ANESTHETIST, CERTIFIED REGISTERED

## 2019-05-21 PROCEDURE — 25000128 H RX IP 250 OP 636: Performed by: STUDENT IN AN ORGANIZED HEALTH CARE EDUCATION/TRAINING PROGRAM

## 2019-05-21 PROCEDURE — 71000015 ZZH RECOVERY PHASE 1 LEVEL 2 EA ADDTL HR: Performed by: SURGERY

## 2019-05-21 PROCEDURE — 82550 ASSAY OF CK (CPK): CPT | Performed by: STUDENT IN AN ORGANIZED HEALTH CARE EDUCATION/TRAINING PROGRAM

## 2019-05-21 PROCEDURE — 40000171 ZZH STATISTIC PRE-PROCEDURE ASSESSMENT III: Performed by: SURGERY

## 2019-05-21 PROCEDURE — 25800030 ZZH RX IP 258 OP 636: Performed by: NURSE ANESTHETIST, CERTIFIED REGISTERED

## 2019-05-21 PROCEDURE — 25000128 H RX IP 250 OP 636: Performed by: NURSE PRACTITIONER

## 2019-05-21 PROCEDURE — 25800030 ZZH RX IP 258 OP 636: Performed by: ANESTHESIOLOGY

## 2019-05-21 PROCEDURE — 36000062 ZZH SURGERY LEVEL 4 1ST 30 MIN - UMMC: Performed by: SURGERY

## 2019-05-21 PROCEDURE — 37000008 ZZH ANESTHESIA TECHNICAL FEE, 1ST 30 MIN: Performed by: SURGERY

## 2019-05-21 PROCEDURE — 12000026 ZZH R&B TRANSPLANT

## 2019-05-21 PROCEDURE — 36415 COLL VENOUS BLD VENIPUNCTURE: CPT | Performed by: STUDENT IN AN ORGANIZED HEALTH CARE EDUCATION/TRAINING PROGRAM

## 2019-05-21 PROCEDURE — 37000009 ZZH ANESTHESIA TECHNICAL FEE, EACH ADDTL 15 MIN: Performed by: SURGERY

## 2019-05-21 PROCEDURE — 25000128 H RX IP 250 OP 636: Performed by: NURSE ANESTHETIST, CERTIFIED REGISTERED

## 2019-05-21 PROCEDURE — 36000064 ZZH SURGERY LEVEL 4 EA 15 ADDTL MIN - UMMC: Performed by: SURGERY

## 2019-05-21 PROCEDURE — C9290 INJ, BUPIVACAINE LIPOSOME: HCPCS | Performed by: STUDENT IN AN ORGANIZED HEALTH CARE EDUCATION/TRAINING PROGRAM

## 2019-05-21 PROCEDURE — 27210794 ZZH OR GENERAL SUPPLY STERILE: Performed by: SURGERY

## 2019-05-21 PROCEDURE — 25000128 H RX IP 250 OP 636: Performed by: ANESTHESIOLOGY

## 2019-05-21 PROCEDURE — 71000014 ZZH RECOVERY PHASE 1 LEVEL 2 FIRST HR: Performed by: SURGERY

## 2019-05-21 PROCEDURE — 00000146 ZZHCL STATISTIC GLUCOSE BY METER IP

## 2019-05-21 PROCEDURE — 0TT14ZZ RESECTION OF LEFT KIDNEY, PERCUTANEOUS ENDOSCOPIC APPROACH: ICD-10-PCS | Performed by: SURGERY

## 2019-05-21 PROCEDURE — 25000125 ZZHC RX 250: Performed by: ANESTHESIOLOGY

## 2019-05-21 PROCEDURE — 25000132 ZZH RX MED GY IP 250 OP 250 PS 637: Performed by: STUDENT IN AN ORGANIZED HEALTH CARE EDUCATION/TRAINING PROGRAM

## 2019-05-21 PROCEDURE — 85018 HEMOGLOBIN: CPT | Performed by: STUDENT IN AN ORGANIZED HEALTH CARE EDUCATION/TRAINING PROGRAM

## 2019-05-21 PROCEDURE — 25000125 ZZHC RX 250: Performed by: SURGERY

## 2019-05-21 PROCEDURE — 25000128 H RX IP 250 OP 636: Performed by: SURGERY

## 2019-05-21 PROCEDURE — 0TB74ZZ EXCISION OF LEFT URETER, PERCUTANEOUS ENDOSCOPIC APPROACH: ICD-10-PCS | Performed by: SURGERY

## 2019-05-21 PROCEDURE — 85014 HEMATOCRIT: CPT | Performed by: STUDENT IN AN ORGANIZED HEALTH CARE EDUCATION/TRAINING PROGRAM

## 2019-05-21 RX ORDER — DEXMEDETOMIDINE HYDROCHLORIDE 4 UG/ML
0.2-1.2 INJECTION, SOLUTION INTRAVENOUS CONTINUOUS
Status: DISCONTINUED | OUTPATIENT
Start: 2019-05-21 | End: 2019-05-21 | Stop reason: HOSPADM

## 2019-05-21 RX ORDER — CETIRIZINE HYDROCHLORIDE 10 MG/1
10 TABLET ORAL DAILY
Status: DISCONTINUED | OUTPATIENT
Start: 2019-05-22 | End: 2019-05-24 | Stop reason: HOSPADM

## 2019-05-21 RX ORDER — PROPOFOL 10 MG/ML
INJECTION, EMULSION INTRAVENOUS CONTINUOUS PRN
Status: DISCONTINUED | OUTPATIENT
Start: 2019-05-21 | End: 2019-05-21

## 2019-05-21 RX ORDER — LIDOCAINE HYDROCHLORIDE 20 MG/ML
INJECTION, SOLUTION INFILTRATION; PERINEURAL PRN
Status: DISCONTINUED | OUTPATIENT
Start: 2019-05-21 | End: 2019-05-21

## 2019-05-21 RX ORDER — ONDANSETRON 2 MG/ML
4 INJECTION INTRAMUSCULAR; INTRAVENOUS EVERY 6 HOURS PRN
Status: DISCONTINUED | OUTPATIENT
Start: 2019-05-21 | End: 2019-05-24 | Stop reason: HOSPADM

## 2019-05-21 RX ORDER — FLUMAZENIL 0.1 MG/ML
0.2 INJECTION, SOLUTION INTRAVENOUS
Status: DISCONTINUED | OUTPATIENT
Start: 2019-05-21 | End: 2019-05-21 | Stop reason: HOSPADM

## 2019-05-21 RX ORDER — DEXTROSE, SODIUM CHLORIDE, SODIUM LACTATE, POTASSIUM CHLORIDE, AND CALCIUM CHLORIDE 5; .6; .31; .03; .02 G/100ML; G/100ML; G/100ML; G/100ML; G/100ML
INJECTION, SOLUTION INTRAVENOUS CONTINUOUS
Status: DISCONTINUED | OUTPATIENT
Start: 2019-05-21 | End: 2019-05-23

## 2019-05-21 RX ORDER — BUPIVACAINE HYDROCHLORIDE 2.5 MG/ML
INJECTION, SOLUTION EPIDURAL; INFILTRATION; INTRACAUDAL PRN
Status: DISCONTINUED | OUTPATIENT
Start: 2019-05-21 | End: 2019-05-21

## 2019-05-21 RX ORDER — AMOXICILLIN 250 MG
1 CAPSULE ORAL 2 TIMES DAILY
Status: DISCONTINUED | OUTPATIENT
Start: 2019-05-21 | End: 2019-05-24 | Stop reason: HOSPADM

## 2019-05-21 RX ORDER — ACETAMINOPHEN 325 MG/1
975 TABLET ORAL ONCE
Status: COMPLETED | OUTPATIENT
Start: 2019-05-21 | End: 2019-05-21

## 2019-05-21 RX ORDER — SCOLOPAMINE TRANSDERMAL SYSTEM 1 MG/1
1 PATCH, EXTENDED RELEASE TRANSDERMAL
Status: DISCONTINUED | OUTPATIENT
Start: 2019-05-21 | End: 2019-05-21

## 2019-05-21 RX ORDER — MANNITOL 20 G/100ML
INJECTION, SOLUTION INTRAVENOUS PRN
Status: DISCONTINUED | OUTPATIENT
Start: 2019-05-21 | End: 2019-05-21

## 2019-05-21 RX ORDER — NALOXONE HYDROCHLORIDE 0.4 MG/ML
.1-.4 INJECTION, SOLUTION INTRAMUSCULAR; INTRAVENOUS; SUBCUTANEOUS
Status: DISCONTINUED | OUTPATIENT
Start: 2019-05-21 | End: 2019-05-21 | Stop reason: HOSPADM

## 2019-05-21 RX ORDER — PROCHLORPERAZINE MALEATE 10 MG
10 TABLET ORAL EVERY 6 HOURS PRN
Status: DISCONTINUED | OUTPATIENT
Start: 2019-05-21 | End: 2019-05-24 | Stop reason: HOSPADM

## 2019-05-21 RX ORDER — ALBUMIN, HUMAN INJ 5% 5 %
250 SOLUTION INTRAVENOUS EVERY 10 MIN PRN
Status: DISCONTINUED | OUTPATIENT
Start: 2019-05-21 | End: 2019-05-21 | Stop reason: HOSPADM

## 2019-05-21 RX ORDER — DEXAMETHASONE SODIUM PHOSPHATE 4 MG/ML
8 INJECTION, SOLUTION INTRA-ARTICULAR; INTRALESIONAL; INTRAMUSCULAR; INTRAVENOUS; SOFT TISSUE ONCE
Status: DISCONTINUED | OUTPATIENT
Start: 2019-05-21 | End: 2019-05-21 | Stop reason: HOSPADM

## 2019-05-21 RX ORDER — ONDANSETRON 2 MG/ML
4 INJECTION INTRAMUSCULAR; INTRAVENOUS EVERY 30 MIN PRN
Status: DISCONTINUED | OUTPATIENT
Start: 2019-05-21 | End: 2019-05-21 | Stop reason: HOSPADM

## 2019-05-21 RX ORDER — CEFUROXIME SODIUM 1.5 G/16ML
1.5 INJECTION, POWDER, FOR SOLUTION INTRAVENOUS
Status: DISCONTINUED | OUTPATIENT
Start: 2019-05-21 | End: 2019-05-21 | Stop reason: HOSPADM

## 2019-05-21 RX ORDER — OXYCODONE HYDROCHLORIDE 5 MG/1
5-10 TABLET ORAL
Status: DISCONTINUED | OUTPATIENT
Start: 2019-05-21 | End: 2019-05-23

## 2019-05-21 RX ORDER — PROTAMINE SULFATE 10 MG/ML
50 INJECTION, SOLUTION INTRAVENOUS ONCE
Status: COMPLETED | OUTPATIENT
Start: 2019-05-21 | End: 2019-05-21

## 2019-05-21 RX ORDER — KETOROLAC TROMETHAMINE 30 MG/ML
15 INJECTION, SOLUTION INTRAMUSCULAR; INTRAVENOUS ONCE
Status: COMPLETED | OUTPATIENT
Start: 2019-05-21 | End: 2019-05-21

## 2019-05-21 RX ORDER — CARDIOPLEG/ORGAN PRESERV NO.1 9-198-2-1
BOTTLE PERFUSION PRN
Status: DISCONTINUED | OUTPATIENT
Start: 2019-05-21 | End: 2019-05-21 | Stop reason: HOSPADM

## 2019-05-21 RX ORDER — METOCLOPRAMIDE 10 MG/1
10 TABLET ORAL EVERY 6 HOURS PRN
Status: DISCONTINUED | OUTPATIENT
Start: 2019-05-21 | End: 2019-05-24 | Stop reason: HOSPADM

## 2019-05-21 RX ORDER — ONDANSETRON 4 MG/1
4 TABLET, ORALLY DISINTEGRATING ORAL EVERY 30 MIN PRN
Status: DISCONTINUED | OUTPATIENT
Start: 2019-05-21 | End: 2019-05-21 | Stop reason: HOSPADM

## 2019-05-21 RX ORDER — ACETAMINOPHEN 325 MG/1
975 TABLET ORAL EVERY 8 HOURS
Status: COMPLETED | OUTPATIENT
Start: 2019-05-21 | End: 2019-05-24

## 2019-05-21 RX ORDER — NALOXONE HYDROCHLORIDE 0.4 MG/ML
.1-.4 INJECTION, SOLUTION INTRAMUSCULAR; INTRAVENOUS; SUBCUTANEOUS
Status: ACTIVE | OUTPATIENT
Start: 2019-05-21 | End: 2019-05-22

## 2019-05-21 RX ORDER — DULOXETIN HYDROCHLORIDE 20 MG/1
20 CAPSULE, DELAYED RELEASE ORAL DAILY
Status: DISCONTINUED | OUTPATIENT
Start: 2019-05-22 | End: 2019-05-24 | Stop reason: HOSPADM

## 2019-05-21 RX ORDER — SODIUM CHLORIDE, SODIUM LACTATE, POTASSIUM CHLORIDE, CALCIUM CHLORIDE 600; 310; 30; 20 MG/100ML; MG/100ML; MG/100ML; MG/100ML
INJECTION, SOLUTION INTRAVENOUS CONTINUOUS
Status: DISCONTINUED | OUTPATIENT
Start: 2019-05-21 | End: 2019-05-21 | Stop reason: HOSPADM

## 2019-05-21 RX ORDER — ONDANSETRON 2 MG/ML
4 INJECTION INTRAMUSCULAR; INTRAVENOUS ONCE
Status: DISCONTINUED | OUTPATIENT
Start: 2019-05-21 | End: 2019-05-21 | Stop reason: HOSPADM

## 2019-05-21 RX ORDER — FENTANYL CITRATE 50 UG/ML
INJECTION, SOLUTION INTRAMUSCULAR; INTRAVENOUS PRN
Status: DISCONTINUED | OUTPATIENT
Start: 2019-05-21 | End: 2019-05-21

## 2019-05-21 RX ORDER — FENTANYL CITRATE 50 UG/ML
25-50 INJECTION, SOLUTION INTRAMUSCULAR; INTRAVENOUS
Status: DISCONTINUED | OUTPATIENT
Start: 2019-05-21 | End: 2019-05-21 | Stop reason: HOSPADM

## 2019-05-21 RX ORDER — AMOXICILLIN 250 MG
2 CAPSULE ORAL 2 TIMES DAILY
Status: DISCONTINUED | OUTPATIENT
Start: 2019-05-21 | End: 2019-05-24 | Stop reason: HOSPADM

## 2019-05-21 RX ORDER — HEPARIN SODIUM 1000 [USP'U]/ML
5000 INJECTION, SOLUTION INTRAVENOUS; SUBCUTANEOUS ONCE
Status: COMPLETED | OUTPATIENT
Start: 2019-05-21 | End: 2019-05-21

## 2019-05-21 RX ORDER — HYDROMORPHONE HYDROCHLORIDE 1 MG/ML
.3-.5 INJECTION, SOLUTION INTRAMUSCULAR; INTRAVENOUS; SUBCUTANEOUS EVERY 5 MIN PRN
Status: DISCONTINUED | OUTPATIENT
Start: 2019-05-21 | End: 2019-05-21 | Stop reason: HOSPADM

## 2019-05-21 RX ORDER — SCOLOPAMINE TRANSDERMAL SYSTEM 1 MG/1
1 PATCH, EXTENDED RELEASE TRANSDERMAL
Status: DISCONTINUED | OUTPATIENT
Start: 2019-05-21 | End: 2019-05-24 | Stop reason: HOSPADM

## 2019-05-21 RX ORDER — ONDANSETRON 2 MG/ML
INJECTION INTRAMUSCULAR; INTRAVENOUS PRN
Status: DISCONTINUED | OUTPATIENT
Start: 2019-05-21 | End: 2019-05-21

## 2019-05-21 RX ORDER — HEPARIN SODIUM 5000 [USP'U]/.5ML
5000 INJECTION, SOLUTION INTRAVENOUS; SUBCUTANEOUS EVERY 12 HOURS
Status: COMPLETED | OUTPATIENT
Start: 2019-05-22 | End: 2019-05-23

## 2019-05-21 RX ORDER — ONDANSETRON 4 MG/1
4 TABLET, ORALLY DISINTEGRATING ORAL EVERY 6 HOURS PRN
Status: DISCONTINUED | OUTPATIENT
Start: 2019-05-21 | End: 2019-05-24 | Stop reason: HOSPADM

## 2019-05-21 RX ORDER — SODIUM CHLORIDE, SODIUM LACTATE, POTASSIUM CHLORIDE, CALCIUM CHLORIDE 600; 310; 30; 20 MG/100ML; MG/100ML; MG/100ML; MG/100ML
1-3 INJECTION, SOLUTION INTRAVENOUS CONTINUOUS
Status: DISCONTINUED | OUTPATIENT
Start: 2019-05-21 | End: 2019-05-21 | Stop reason: HOSPADM

## 2019-05-21 RX ORDER — PROPOFOL 10 MG/ML
INJECTION, EMULSION INTRAVENOUS PRN
Status: DISCONTINUED | OUTPATIENT
Start: 2019-05-21 | End: 2019-05-21

## 2019-05-21 RX ORDER — TOPIRAMATE 25 MG/1
25 TABLET, FILM COATED ORAL 2 TIMES DAILY
Status: DISCONTINUED | OUTPATIENT
Start: 2019-05-21 | End: 2019-05-22

## 2019-05-21 RX ORDER — FUROSEMIDE 10 MG/ML
INJECTION INTRAMUSCULAR; INTRAVENOUS PRN
Status: DISCONTINUED | OUTPATIENT
Start: 2019-05-21 | End: 2019-05-21

## 2019-05-21 RX ORDER — HEPARIN SODIUM 5000 [USP'U]/.5ML
5000 INJECTION, SOLUTION INTRAVENOUS; SUBCUTANEOUS ONCE
Status: COMPLETED | OUTPATIENT
Start: 2019-05-21 | End: 2019-05-21

## 2019-05-21 RX ORDER — METOCLOPRAMIDE HYDROCHLORIDE 5 MG/ML
10 INJECTION INTRAMUSCULAR; INTRAVENOUS EVERY 6 HOURS PRN
Status: DISCONTINUED | OUTPATIENT
Start: 2019-05-21 | End: 2019-05-24 | Stop reason: HOSPADM

## 2019-05-21 RX ORDER — GABAPENTIN 300 MG/1
300 CAPSULE ORAL ONCE
Status: COMPLETED | OUTPATIENT
Start: 2019-05-21 | End: 2019-05-21

## 2019-05-21 RX ORDER — NALOXONE HYDROCHLORIDE 0.4 MG/ML
.1-.4 INJECTION, SOLUTION INTRAMUSCULAR; INTRAVENOUS; SUBCUTANEOUS
Status: DISCONTINUED | OUTPATIENT
Start: 2019-05-21 | End: 2019-05-24 | Stop reason: HOSPADM

## 2019-05-21 RX ORDER — HYDROMORPHONE HYDROCHLORIDE 1 MG/ML
.3-.5 INJECTION, SOLUTION INTRAMUSCULAR; INTRAVENOUS; SUBCUTANEOUS
Status: DISCONTINUED | OUTPATIENT
Start: 2019-05-21 | End: 2019-05-23

## 2019-05-21 RX ORDER — LABETALOL 20 MG/4 ML (5 MG/ML) INTRAVENOUS SYRINGE
10
Status: DISCONTINUED | OUTPATIENT
Start: 2019-05-21 | End: 2019-05-21 | Stop reason: HOSPADM

## 2019-05-21 RX ORDER — DEXAMETHASONE SODIUM PHOSPHATE 4 MG/ML
INJECTION, SOLUTION INTRA-ARTICULAR; INTRALESIONAL; INTRAMUSCULAR; INTRAVENOUS; SOFT TISSUE PRN
Status: DISCONTINUED | OUTPATIENT
Start: 2019-05-21 | End: 2019-05-21

## 2019-05-21 RX ADMIN — CEFUROXIME 1.5 G: 1.5 INJECTION, POWDER, FOR SOLUTION INTRAVENOUS at 08:41

## 2019-05-21 RX ADMIN — SODIUM CHLORIDE, POTASSIUM CHLORIDE, SODIUM LACTATE AND CALCIUM CHLORIDE 500 ML: 600; 310; 30; 20 INJECTION, SOLUTION INTRAVENOUS at 13:02

## 2019-05-21 RX ADMIN — MIDAZOLAM 2 MG: 1 INJECTION INTRAMUSCULAR; INTRAVENOUS at 07:49

## 2019-05-21 RX ADMIN — BUPIVACAINE 266 MG: 13.3 INJECTION, SUSPENSION, LIPOSOMAL INFILTRATION at 07:13

## 2019-05-21 RX ADMIN — ONDANSETRON 4 MG: 4 TABLET, ORALLY DISINTEGRATING ORAL at 16:18

## 2019-05-21 RX ADMIN — FENTANYL CITRATE 25 MCG: 50 INJECTION INTRAMUSCULAR; INTRAVENOUS at 12:29

## 2019-05-21 RX ADMIN — ONDANSETRON 4 MG: 2 INJECTION INTRAMUSCULAR; INTRAVENOUS at 08:19

## 2019-05-21 RX ADMIN — Medication 0.4 MG: at 13:42

## 2019-05-21 RX ADMIN — Medication 0.3 MG: at 19:00

## 2019-05-21 RX ADMIN — Medication 0.3 MG: at 13:26

## 2019-05-21 RX ADMIN — PROTAMINE SULFATE 10 MG: 10 INJECTION, SOLUTION INTRAVENOUS at 10:42

## 2019-05-21 RX ADMIN — SODIUM CHLORIDE, SODIUM LACTATE, POTASSIUM CHLORIDE, CALCIUM CHLORIDE AND DEXTROSE MONOHYDRATE: 5; 600; 310; 30; 20 INJECTION, SOLUTION INTRAVENOUS at 12:55

## 2019-05-21 RX ADMIN — HEPARIN SODIUM 5000 UNITS: 1000 INJECTION, SOLUTION INTRAVENOUS; SUBCUTANEOUS at 10:31

## 2019-05-21 RX ADMIN — MANNITOL 12.5 G: 20 INJECTION, SOLUTION INTRAVENOUS at 09:34

## 2019-05-21 RX ADMIN — HYDROMORPHONE HYDROCHLORIDE 0.5 MG: 1 INJECTION, SOLUTION INTRAMUSCULAR; INTRAVENOUS; SUBCUTANEOUS at 09:51

## 2019-05-21 RX ADMIN — ACETAMINOPHEN 975 MG: 325 TABLET, FILM COATED ORAL at 16:16

## 2019-05-21 RX ADMIN — PROTAMINE SULFATE 20 MG: 10 INJECTION, SOLUTION INTRAVENOUS at 10:44

## 2019-05-21 RX ADMIN — OMEPRAZOLE 20 MG: 20 CAPSULE, DELAYED RELEASE ORAL at 16:16

## 2019-05-21 RX ADMIN — ROCURONIUM BROMIDE 10 MG: 10 INJECTION INTRAVENOUS at 11:06

## 2019-05-21 RX ADMIN — FENTANYL CITRATE 50 MCG: 50 INJECTION, SOLUTION INTRAMUSCULAR; INTRAVENOUS at 09:36

## 2019-05-21 RX ADMIN — FENTANYL CITRATE 50 MCG: 50 INJECTION, SOLUTION INTRAMUSCULAR; INTRAVENOUS at 07:11

## 2019-05-21 RX ADMIN — FENTANYL CITRATE 50 MCG: 50 INJECTION, SOLUTION INTRAMUSCULAR; INTRAVENOUS at 08:47

## 2019-05-21 RX ADMIN — PROTAMINE SULFATE 20 MG: 10 INJECTION, SOLUTION INTRAVENOUS at 10:43

## 2019-05-21 RX ADMIN — PROCHLORPERAZINE EDISYLATE 10 MG: 5 INJECTION INTRAMUSCULAR; INTRAVENOUS at 19:54

## 2019-05-21 RX ADMIN — ROCURONIUM BROMIDE 20 MG: 10 INJECTION INTRAVENOUS at 10:30

## 2019-05-21 RX ADMIN — ONDANSETRON 4 MG: 2 INJECTION INTRAMUSCULAR; INTRAVENOUS at 12:30

## 2019-05-21 RX ADMIN — ROCURONIUM BROMIDE 30 MG: 10 INJECTION INTRAVENOUS at 09:43

## 2019-05-21 RX ADMIN — LIDOCAINE HYDROCHLORIDE 30 MG: 20 INJECTION, SOLUTION INFILTRATION; PERINEURAL at 07:49

## 2019-05-21 RX ADMIN — DEXAMETHASONE SODIUM PHOSPHATE 8 MG: 4 INJECTION, SOLUTION INTRA-ARTICULAR; INTRALESIONAL; INTRAMUSCULAR; INTRAVENOUS; SOFT TISSUE at 08:19

## 2019-05-21 RX ADMIN — FENTANYL CITRATE 25 MCG: 50 INJECTION INTRAMUSCULAR; INTRAVENOUS at 13:01

## 2019-05-21 RX ADMIN — ACETAMINOPHEN 975 MG: 325 TABLET, FILM COATED ORAL at 05:43

## 2019-05-21 RX ADMIN — PROPOFOL 150 MCG/KG/MIN: 10 INJECTION, EMULSION INTRAVENOUS at 08:06

## 2019-05-21 RX ADMIN — SODIUM CHLORIDE, POTASSIUM CHLORIDE, SODIUM LACTATE AND CALCIUM CHLORIDE: 600; 310; 30; 20 INJECTION, SOLUTION INTRAVENOUS at 10:47

## 2019-05-21 RX ADMIN — PHENYLEPHRINE HYDROCHLORIDE 100 MCG: 10 INJECTION, SOLUTION INTRAMUSCULAR; INTRAVENOUS; SUBCUTANEOUS at 08:37

## 2019-05-21 RX ADMIN — FUROSEMIDE 20 MG: 10 INJECTION, SOLUTION INTRAVENOUS at 09:31

## 2019-05-21 RX ADMIN — ROCURONIUM BROMIDE 50 MG: 10 INJECTION INTRAVENOUS at 07:49

## 2019-05-21 RX ADMIN — PROPOFOL 200 MG: 10 INJECTION, EMULSION INTRAVENOUS at 07:49

## 2019-05-21 RX ADMIN — CEFUROXIME 1.5 G: 1.5 INJECTION, POWDER, FOR SOLUTION INTRAVENOUS at 10:40

## 2019-05-21 RX ADMIN — GABAPENTIN 300 MG: 300 CAPSULE ORAL at 05:44

## 2019-05-21 RX ADMIN — PROCHLORPERAZINE EDISYLATE 2.5 MG: 5 INJECTION INTRAMUSCULAR; INTRAVENOUS at 14:20

## 2019-05-21 RX ADMIN — SODIUM CHLORIDE, POTASSIUM CHLORIDE, SODIUM LACTATE AND CALCIUM CHLORIDE: 600; 310; 30; 20 INJECTION, SOLUTION INTRAVENOUS at 07:49

## 2019-05-21 RX ADMIN — HEPARIN SODIUM 5000 UNITS: 5000 INJECTION, SOLUTION INTRAVENOUS; SUBCUTANEOUS at 07:14

## 2019-05-21 RX ADMIN — KETOROLAC TROMETHAMINE 15 MG: 30 INJECTION, SOLUTION INTRAMUSCULAR at 05:44

## 2019-05-21 RX ADMIN — SUGAMMADEX 200 MG: 100 INJECTION, SOLUTION INTRAVENOUS at 11:44

## 2019-05-21 RX ADMIN — ROCURONIUM BROMIDE 50 MG: 10 INJECTION INTRAVENOUS at 08:48

## 2019-05-21 RX ADMIN — SENNOSIDES AND DOCUSATE SODIUM 1 TABLET: 8.6; 5 TABLET ORAL at 19:00

## 2019-05-21 RX ADMIN — BUPIVACAINE HYDROCHLORIDE 20 ML: 2.5 INJECTION, SOLUTION EPIDURAL; INFILTRATION; INTRACAUDAL; PERINEURAL at 07:13

## 2019-05-21 RX ADMIN — FUROSEMIDE 20 MG: 10 INJECTION, SOLUTION INTRAVENOUS at 09:35

## 2019-05-21 RX ADMIN — SODIUM CHLORIDE, POTASSIUM CHLORIDE, SODIUM LACTATE AND CALCIUM CHLORIDE: 600; 310; 30; 20 INJECTION, SOLUTION INTRAVENOUS at 09:23

## 2019-05-21 RX ADMIN — SCOPALAMINE 1 PATCH: 1 PATCH, EXTENDED RELEASE TRANSDERMAL at 07:49

## 2019-05-21 RX ADMIN — MIDAZOLAM 1 MG: 1 INJECTION INTRAMUSCULAR; INTRAVENOUS at 07:11

## 2019-05-21 ASSESSMENT — ACTIVITIES OF DAILY LIVING (ADL)
ADLS_ACUITY_SCORE: 17
ADLS_ACUITY_SCORE: 17

## 2019-05-21 ASSESSMENT — MIFFLIN-ST. JEOR: SCORE: 1578.88

## 2019-05-21 NOTE — ANESTHESIA POSTPROCEDURE EVALUATION
Anesthesia POST Procedure Evaluation    Patient: Kinjal Crain   MRN:     [id number] Gender:   female   Age:    23 year old :      1995        Preoperative Diagnosis: Kidney Donor   Procedure(s):  Laparoscopic Hand Assisted Right Donor Nephrectomy,  Living Non Directed   Postop Comments: No value filed.       Anesthesia Type:  General  No value filed.    Reportable Event: NO     PAIN: Uncomplicated   Sign Out status: Comfortable, Well controlled pain     PONV: No PONV   Sign Out status:  No Nausea or Vomiting     Neuro/Psych: Uneventful perioperative course   Sign Out Status: Preoperative baseline; Age appropriate mentation     Airway/Resp.: Uneventful perioperative course   Sign Out Status: Non labored breathing, age appropriate RR; Resp. Status within EXPECTED Parameters     CV: Uneventful perioperative course   Sign Out status: Appropriate BP and perfusion indices; Appropriate HR/Rhythm     Disposition:   Sign Out in:  PACU  Disposition:  Floor  Recovery Course: Uneventful  Follow-Up: Not required           Last Anesthesia Record Vitals:  CRNA VITALS  2019 1125 - 2019 1225      2019             Resp Rate (observed):  25          Last PACU Vitals:  Vitals Value Taken Time   /67 2019  2:30 PM   Temp 36.8  C (98.3  F) 2019 12:30 PM   Pulse 102 2019  2:30 PM   Resp 22 2019  2:15 PM   SpO2 96 % 2019  2:32 PM   Temp src     NIBP     Pulse     SpO2     Resp     Temp     Ht Rate     Temp 2     Vitals shown include unvalidated device data.      Electronically Signed By: Latesha Heck MD, May 21, 2019, 2:34 PM

## 2019-05-21 NOTE — OR NURSING
Following message sent to [facility] PACU: Hemoglobin 12.9 and hematocrit 39.3. [phone] for any questions or interventions. [address]

## 2019-05-21 NOTE — OR NURSING
BP 90/63 after recheck. Dr. Heck notified and ordered 500 LR bolus to be given. Will recheck once bolus is complete.

## 2019-05-21 NOTE — ANESTHESIA CARE TRANSFER NOTE
Patient: Kinjal Crain    Procedure(s):  Laparoscopic Hand Assisted Right Donor Nephrectomy,  Living Non Directed    Diagnosis: Kidney Donor  Diagnosis Additional Information: No value filed.    Anesthesia Type:   No value filed.     Note:  Airway :Face Mask  Patient transferred to:PACU  Comments: Anesthesia Care Transfer Note      Transfer to:  PACU    Patient Vital Signs:  Stable    Airway:  None    Patient transported to PACU with supplemental O2.  Patient alert and breathing comfortably.  VSS.  Care transferred with report to PACU RN.    Jose Enrique Garrido CRNAHandoff Report: Identifed the Patient, Identified the Reponsible Provider, Reviewed the pertinent medical history, Discussed the surgical course, Reviewed Intra-OP anesthesia mangement and issues during anesthesia, Set expectations for post-procedure period and Allowed opportunity for questions and acknowledgement of understanding      Vitals: (Last set prior to Anesthesia Care Transfer)    CRNA VITALS  5/21/2019 1125 - 5/21/2019 1200      5/21/2019             Resp Rate (observed):  25                Electronically Signed By: CORTNEY Joy CRNA  May 21, 2019  12:00 PM

## 2019-05-21 NOTE — PROGRESS NOTES
"Post Op Check    05/21/2019    Kinjal Crain is a 23 year old female with h/o Migraine, GERD, DUB s/p hysterectomy. She is now POD#0 s/p right donor nephrectomy for donation.    Pt reports nausea without vomiting. Denies SOB, chest pain, or dizziness. Salinas present. Tolerated a few ice chips..    /64 (BP Location: Right arm)   Pulse 101   Temp 98.4  F (36.9  C) (Oral)   Resp 18   Ht 1.651 m (5' 5\")   Wt 82.8 kg (182 lb 8.7 oz)   SpO2 96%   BMI 30.38 kg/m      Gen: sleepy but easy to arouse, Oriented x3, NAD  Chest: breathing non-labored, 1lNC.  Abdomen: soft, appropriately tender  Incision: surgical dressing intact. Binder in place.  : Salinas clear yellow  Extremities: warm and well perfused    A/P:No acute post-op issues. Continue plan of care.  Acute postoperative Pain: Controlled, continue acetaminophen Q8 scheduled, oxycodone 5-10 Q3, Dilaudid 0.3-0.5mg Q1, block  Nausea: Continue antiemetics-zofran/reglan/compazine    Perla Rossi NP  Transplant surgery, #[id number]      "

## 2019-05-21 NOTE — LETTER
Transition Communication Hand-off for Care Transitions to Next Level of Care Provider    Name: Kinjal Crain  : 1995  MRN #: [id number]  Primary Care Provider: Enrrique Humphries     Primary Clinic: [facility] [address] [place] [address] [facility]  [place] [address]     Reason for Hospitalization:  Kidney donor [Z52.4]  Admit Date/Time: 2019  5:05 AM  Discharge Date: 19  Payor Source: Payor: BCBS / Plan: BCBS OUT OF STATE / Product Type: Indemnity /              Reason for Communication Hand-off Referral: Other K donor    Discharge Plan:       Concern for non-adherence with plan of care:   Y/N N  Discharge Needs Assessment:        Follow-up specialty is recommended: Yes    Follow-up plan:    Future Appointments   Date Time Provider Department Center   2019 12:40 PM Britney Dial MD [facility]       Any outstanding tests or procedures:              Key Recommendations:      Ana María Weiss    AVS/Discharge Summary is the source of truth; this is a helpful guide for improved communication of patient story

## 2019-05-21 NOTE — ANESTHESIA PROCEDURE NOTES
Peripheral Nerve Block Procedure Note    Staff:     Anesthesiologist:  Delmar Sheikh DO    Resident/CRNA:  Darell Barney MD    Block performed by resident/CRNA in the presence of a teaching physician    Location: Pre-op  Procedure Start/Stop TImes:      5/21/2019 7:10 AM     5/21/2019 7:15 AM    patient identified, IV checked, site marked, risks and benefits discussed, informed consent, monitors and equipment checked, pre-op evaluation, at physician/surgeon's request and post-op pain management      Correct Patient: Yes      Correct Position: Yes      Correct Site: Yes      Correct Procedure: Yes      Correct Laterality:  Yes    Site Marked:  Yes  Procedure details:     Procedure:  TAP    ASA:  1 and 2    Diagnosis:  Perioperative Pain    Laterality:  Bilateral    Position:  Supine    Sterile Prep: chloraprep, mask and sterile gloves      Local skin infiltration:  None    Needle:  Short bevel    Needle gauge:  21    Needle length (mm):  110    Ultrasound: Yes      Ultrasound used to identify targeted nerve, plexus, or vascular structure and placed a needle adjacent to it      Permanent Image entered into patiient's record      Abnormal pain on injection: No      Blood Aspirated: No      Paresthesias:  No    Bleeding at site: No      Bolus via:  Needle    Infusion Method:  Single Shot    Complications:  None  Assessment/Narrative:     Injection made incrementally with aspirations every (mL):  5     266 mg of exparel administered for the TAP block.    Discussed with Patient Off-Label use of Liposomal Bupivacaine (Exparel) for Nerve Block.    Relevant risks & benefits were discussed with patient.    All questions were answered and there was agreement to proceed.    Patient signed Off-Label Use of Exparel Consent Form.

## 2019-05-21 NOTE — OP NOTE
Transplant Surgery  Operative Note    Procedure Date: 05/21/19   Preoperative Diagnosis: Healthy kidney donor   Postoperative Diagnosis: Healthy kidney donor   Procedure: 1. Right Kidney  living donor nephrectomy for donoation   Secondary Procedure:  None   Surgeon:    Surgeon(s) and Role:     * Britney Dial MD - Primary     * Finger, José Miguel Maria MD - Assisting     * Marshall Bennett MD - Fellow - Assisting   Fellow/Assistant:   james Murray,  There was no qualified resident to assist with this procedure.     Specimen: Right kidney and ureter   Anesthesia: General   Urine Output: 1200 mls Estimated Blood Loss: 50 mls  Fluids Administered: 2300     Intra Op Events: none unexpected     Complications: None.    Findings: normal anatomy      Donor UNOS ID:  [id number] Flush Start time: 5/21/2019 10:44 AM   Arterial Clamp:  5/21/2019 10:39 AM Arterial anatomy: single   Venous anatomy:  single Ureteral anatomy:  single     Indication: Kinjal Crain presents for Right Kidney donation. The patient has undergone a thorough medical and psychosocial evaluation and was found suitable for voluntary kidney donation. Risks and benefits of donation were discussed. The patient expressed understanding, was willing to proceed, and provided informed consent.    Final ABO/Crossmatch verification: Prior to incision, I verified the donor ABO and recipient ABO.  I visually verified that the donor identification, blood type, and other vital data were compatible with the recipient.     Operative Procedure: Kinjal Crain was transported to the operating room, placed on the operating table in the left lateral decubitus position and a universal timeout was performed. Sequential compression devices were placed on both lower extremities and general endotracheal anesthesia was induced.  The patient was given IV antibiotics and Salinas catheter.  The abdomen was shaved, prepped, and draped in the usual sterile fashion.    We made a 6 cm  periumbilical midline incision and carried it down thru the linea alba.  The peritoneum was opened under direct vision.  The hand port was put into position and a right lower quadrant 10 mm port was placed over a trocar with hand assistance.  Pneumoperitoneum with CO2 was provided to 12 mmHg.  General survey with the laparoscope revealed no unusual findings.  An additional 10 mm port was placed in the midline in the upper abdomen under direct vision.    The right colon was released from its lateral attachments and rotated medially, revealing the kidney, ureter, duodenum and vena cava. The ureter was circumferentially dissected free distally then proximally taking care to preserve its vasculature.  We  identified the gonadal vein and left it insitu. The proximal gonadal vein was not ligated.      We placed a 5 mm port in the right flank for a liver retractor, placed to lift the liver and gallbladder anteriorly. The duodenum was fully Kocherized and the anterior surface of the vena cava was cleared of investing tissue. Gerota's fascia was incised along the upper pole of the kidney and a plane was created between the kidney and the adrenal gland with the harmonic scalpel. The renal vein was then circumferentially cleared of extraneous tissue. The lower aspect of the renal artery was identified and cleared of investing lymphatics. We dissected  the kidney and ureter free posteriorly from the psoas up to the level of the renal artery.  The patient was given fluid, mannitol and Lasix, and the kidney was then dissected free from its lateral and superior attachments allowing full medial rotation. The renal artery was then circumferentially cleared of lymphatics and fat proximally toward its origin and behind the cava. The patient was heparinized and the distal ureter and gonadal vein were clipped and divided.  Good urine flow was seen. A laparoscopic stapler was used fired across the renal artery and vein.     The kidney  was delivered from the hand port, flushed, and taken to recipient room. Protamine was administered for full heparin reversal. Pneumoperitoneum was reestablished and hemostasis was obtained. Vascular transection sites were visualized and confirmed secure. The colon was placed back in its natural position. The 10 mm port sites were closed with 0-vicryl. The hand port was closed with looped 0-PDS. Skin incisions were irrigated and closed with 4-0 monocryl and Dermabond. Needle, sponge, and instrument counts were correct x2.  Faculty was present for key portions of the procedure. The patient tolerated the procedure well without apparent complications and was extubated and transferred to PACU in good condition.  Physician Attestation   I was present for the key portions of the procedure and I was immediately available for the entire procedure between opening and closing.    Britney Dial MD  Date of Service (when I saw the patient): 5/21/2019

## 2019-05-21 NOTE — ANESTHESIA PREPROCEDURE EVALUATION
Anesthesia Pre-Procedure Evaluation    Patient: Kinjal Crain   MRN:     [id number] Gender:   female   Age:    23 year old :      1995        Preoperative Diagnosis: Kidney Donor   Procedure(s):  Laparoscopic Hand Assisted Living Non Directed (Anonymous) Kidney Donor **Latex Allergy**     Past Medical History:   Diagnosis Date     DUB (dysfunctional uterine bleeding)     s/p hysterectomy      GERD (gastroesophageal reflux disease)      Migraine     botox     Tachycardia       Past Surgical History:   Procedure Laterality Date     HYSTERECTOMY       TONSILLECTOMY            Anesthesia Evaluation     . Pt has had prior anesthetic.     No history of anesthetic complications          ROS/MED HX    ENT/Pulmonary:       Neurologic:       Cardiovascular:         METS/Exercise Tolerance:  >4 METS   Hematologic:         Musculoskeletal:         GI/Hepatic:     (+) GERD Asymptomatic on medication,       Renal/Genitourinary:         Endo:         Psychiatric:         Infectious Disease:         Malignancy:         Other:    (+) No chance of pregnancy C-spine cleared: N/A, no H/O Chronic Pain,no other significant disability                        PHYSICAL EXAM:   Mental Status/Neuro:    Airway: Facies: Feasible  Mallampati: I  Mouth/Opening: Full  TM distance: > 6 cm  Neck ROM: Full   Respiratory: Auscultation: CTAB     Resp. Rate: Normal      CV: Rhythm: Regular  Rate: Age appropriate   Comments:      Dental: Normal                  Lab Results   Component Value Date    WBC 9.5 2018    HGB 15.2 2019    HCT 41.3 2018     2018     2018    POTASSIUM 3.8 2018    CHLORIDE 107 2018    CO2 23 2018    BUN 17 2018    CR 0.70 2019    GLC 98 2018    HUNTER 8.8 2018    PHOS 4.4 2018    ALBUMIN 4.1 2018    PROTTOTAL 7.6 2018    ALT 41 2018    AST 25 2018    ALKPHOS 164 (H) 2018    BILITOTAL 0.2 2018    PTT  "27 07/20/2018    INR 0.98 07/20/2018       Preop Vitals  BP Readings from Last 3 Encounters:   05/21/19 91/57   05/20/19 126/87   07/20/18 128/85    Pulse Readings from Last 3 Encounters:   05/21/19 71   05/20/19 110   07/20/18 100      Resp Readings from Last 3 Encounters:   05/21/19 16   07/20/18 18    SpO2 Readings from Last 3 Encounters:   05/21/19 100%   05/20/19 98%   07/20/18 100%      Temp Readings from Last 1 Encounters:   05/21/19 36.7  C (98  F) (Oral)    Ht Readings from Last 1 Encounters:   05/21/19 1.651 m (5' 5\")      Wt Readings from Last 1 Encounters:   05/21/19 82.8 kg (182 lb 8.7 oz)    Estimated body mass index is 30.38 kg/m  as calculated from the following:    Height as of this encounter: 1.651 m (5' 5\").    Weight as of this encounter: 82.8 kg (182 lb 8.7 oz).     LDA:  Peripheral IV 05/21/19 Left Hand (Active)   Site Assessment WDL 5/21/2019  5:55 AM   Line Status Saline locked 5/21/2019  5:55 AM   Phlebitis Scale 0-->no symptoms 5/21/2019  5:55 AM   Infiltration Scale 0 5/21/2019  5:55 AM   Extravasation? No 5/21/2019  5:55 AM   Number of days: 0       Peripheral IV 05/21/19 Left Upper forearm (Active)   Number of days: 0       ETT (adult) 6.5 (Active)   Number of days: 0       NG/OG Tube Orogastric 18 fr Center mouth (Active)   Number of days: 0       Urethral Catheter Non-latex;Double-lumen;Straight-tip 14 fr (Active)   Number of days: 0            Assessment:   ASA SCORE: 2    NPO Status: > 6 hours since completed Solid Foods   Documentation: H&P complete; Preop Testing complete; Consents complete   Proceeding: Proceed without further delay  Tobacco Use:  NO Active use of Tobacco/UNKNOWN Tobacco use status     Plan:   Anes. Type:  General   Pre-Induction: Midazolam IV; Acetaminophen PO; Gabapentin PO   Induction:  IV (Standard)   Airway: Oral ETT   Access/Monitoring: PIV; 2nd PIV   Maintenance: Balanced   Emergence: Procedure Site   Logistics: Same Day Surgery     Postop Pain/Sedation " Strategy:  Standard-Options: Opioids PRN     PONV Management:  Adult Risk Factors: Female, H/o PONV or Motion Sickness, Non-Smoker, Postop Opioids  Prevention: Ondansetron; Propofol Infusion; Scop. Patch; Dexamethasone     CONSENT: Direct conversation   Plan and risks discussed with: Patient   Blood Products: Consented (ALL Blood Products)         Anesthesia plan was discussed in detail with patient. She understood and agreed to proceed as planned. All questions answered                Israel Barnhart MD

## 2019-05-21 NOTE — OR NURSING
Sabrina LIM called back after page. Ordered Creatine kinase to be drawn. Also updated on pt blood pressure. Will notify if SBP <90.

## 2019-05-22 ENCOUNTER — TELEPHONE (OUTPATIENT)
Dept: TRANSPLANT | Facility: CLINIC | Age: 24
End: 2019-05-22

## 2019-05-22 ENCOUNTER — DOCUMENTATION ONLY (OUTPATIENT)
Dept: TRANSPLANT | Facility: CLINIC | Age: 24
End: 2019-05-22

## 2019-05-22 LAB
BUN SERPL-MCNC: 17 MG/DL (ref 7–30)
CK SERPL-CCNC: 165 U/L (ref 30–225)
CREAT SERPL-MCNC: 1.14 MG/DL (ref 0.52–1.04)
GFR SERPL CREATININE-BSD FRML MDRD: 67 ML/MIN/{1.73_M2}
GLUCOSE BLDC GLUCOMTR-MCNC: 97 MG/DL (ref 70–99)
HCT VFR BLD AUTO: 36.5 % (ref 35–47)
HGB BLD-MCNC: 11.6 G/DL (ref 11.7–15.7)
PLATELET # BLD AUTO: 397 10E9/L (ref 150–450)

## 2019-05-22 PROCEDURE — 82565 ASSAY OF CREATININE: CPT | Performed by: STUDENT IN AN ORGANIZED HEALTH CARE EDUCATION/TRAINING PROGRAM

## 2019-05-22 PROCEDURE — 25000128 H RX IP 250 OP 636: Performed by: STUDENT IN AN ORGANIZED HEALTH CARE EDUCATION/TRAINING PROGRAM

## 2019-05-22 PROCEDURE — 25000131 ZZH RX MED GY IP 250 OP 636 PS 637: Performed by: STUDENT IN AN ORGANIZED HEALTH CARE EDUCATION/TRAINING PROGRAM

## 2019-05-22 PROCEDURE — 25800030 ZZH RX IP 258 OP 636: Performed by: STUDENT IN AN ORGANIZED HEALTH CARE EDUCATION/TRAINING PROGRAM

## 2019-05-22 PROCEDURE — 99231 SBSQ HOSP IP/OBS SF/LOW 25: CPT | Performed by: NURSE PRACTITIONER

## 2019-05-22 PROCEDURE — 85027 COMPLETE CBC AUTOMATED: CPT | Performed by: STUDENT IN AN ORGANIZED HEALTH CARE EDUCATION/TRAINING PROGRAM

## 2019-05-22 PROCEDURE — 00000146 ZZHCL STATISTIC GLUCOSE BY METER IP

## 2019-05-22 PROCEDURE — 36415 COLL VENOUS BLD VENIPUNCTURE: CPT | Performed by: STUDENT IN AN ORGANIZED HEALTH CARE EDUCATION/TRAINING PROGRAM

## 2019-05-22 PROCEDURE — 25000132 ZZH RX MED GY IP 250 OP 250 PS 637: Performed by: STUDENT IN AN ORGANIZED HEALTH CARE EDUCATION/TRAINING PROGRAM

## 2019-05-22 PROCEDURE — 84520 ASSAY OF UREA NITROGEN: CPT | Performed by: STUDENT IN AN ORGANIZED HEALTH CARE EDUCATION/TRAINING PROGRAM

## 2019-05-22 PROCEDURE — 12000026 ZZH R&B TRANSPLANT

## 2019-05-22 PROCEDURE — 82550 ASSAY OF CK (CPK): CPT | Performed by: STUDENT IN AN ORGANIZED HEALTH CARE EDUCATION/TRAINING PROGRAM

## 2019-05-22 RX ORDER — BISACODYL 10 MG
10 SUPPOSITORY, RECTAL RECTAL DAILY
Status: COMPLETED | OUTPATIENT
Start: 2019-05-22 | End: 2019-05-23

## 2019-05-22 RX ORDER — OLOPATADINE HYDROCHLORIDE 2 MG/ML
1 SOLUTION/ DROPS OPHTHALMIC EVERY 6 HOURS PRN
COMMUNITY

## 2019-05-22 RX ORDER — PROMETHAZINE HYDROCHLORIDE 25 MG/1
25 TABLET ORAL EVERY 6 HOURS PRN
COMMUNITY

## 2019-05-22 RX ORDER — CHOLECALCIFEROL (VITAMIN D3) 50 MCG
1 TABLET ORAL DAILY
COMMUNITY

## 2019-05-22 RX ORDER — ONDANSETRON 4 MG/1
4 TABLET, ORALLY DISINTEGRATING ORAL EVERY 8 HOURS PRN
Status: ON HOLD | COMMUNITY
End: 2019-05-24

## 2019-05-22 RX ORDER — RIZATRIPTAN BENZOATE 10 MG/1
10 TABLET, ORALLY DISINTEGRATING ORAL
COMMUNITY

## 2019-05-22 RX ADMIN — OXYCODONE HYDROCHLORIDE 10 MG: 5 TABLET ORAL at 04:25

## 2019-05-22 RX ADMIN — SENNOSIDES AND DOCUSATE SODIUM 2 TABLET: 8.6; 5 TABLET ORAL at 19:28

## 2019-05-22 RX ADMIN — CETIRIZINE HYDROCHLORIDE 10 MG: 10 TABLET, FILM COATED ORAL at 07:35

## 2019-05-22 RX ADMIN — OXYCODONE HYDROCHLORIDE 5 MG: 5 TABLET ORAL at 00:10

## 2019-05-22 RX ADMIN — OXYCODONE HYDROCHLORIDE 5 MG: 5 TABLET ORAL at 15:44

## 2019-05-22 RX ADMIN — OXYCODONE HYDROCHLORIDE 5 MG: 5 TABLET ORAL at 09:35

## 2019-05-22 RX ADMIN — ONDANSETRON 4 MG: 4 TABLET, ORALLY DISINTEGRATING ORAL at 15:46

## 2019-05-22 RX ADMIN — ONDANSETRON 4 MG: 2 INJECTION INTRAMUSCULAR; INTRAVENOUS at 04:14

## 2019-05-22 RX ADMIN — DULOXETINE HYDROCHLORIDE 20 MG: 20 CAPSULE, DELAYED RELEASE ORAL at 07:35

## 2019-05-22 RX ADMIN — SODIUM CHLORIDE, SODIUM LACTATE, POTASSIUM CHLORIDE, CALCIUM CHLORIDE AND DEXTROSE MONOHYDRATE: 5; 600; 310; 30; 20 INJECTION, SOLUTION INTRAVENOUS at 04:16

## 2019-05-22 RX ADMIN — SENNOSIDES AND DOCUSATE SODIUM 2 TABLET: 8.6; 5 TABLET ORAL at 07:35

## 2019-05-22 RX ADMIN — BISACODYL 10 MG: 10 SUPPOSITORY RECTAL at 12:25

## 2019-05-22 RX ADMIN — ACETAMINOPHEN 975 MG: 325 TABLET, FILM COATED ORAL at 00:10

## 2019-05-22 RX ADMIN — HEPARIN SODIUM 5000 UNITS: 5000 INJECTION, SOLUTION INTRAVENOUS; SUBCUTANEOUS at 19:28

## 2019-05-22 RX ADMIN — OMEPRAZOLE 20 MG: 20 CAPSULE, DELAYED RELEASE ORAL at 07:36

## 2019-05-22 RX ADMIN — ACETAMINOPHEN 975 MG: 325 TABLET, FILM COATED ORAL at 07:35

## 2019-05-22 RX ADMIN — HEPARIN SODIUM 5000 UNITS: 5000 INJECTION, SOLUTION INTRAVENOUS; SUBCUTANEOUS at 07:35

## 2019-05-22 RX ADMIN — OXYCODONE HYDROCHLORIDE 5 MG: 5 TABLET ORAL at 16:56

## 2019-05-22 RX ADMIN — ACETAMINOPHEN 975 MG: 325 TABLET, FILM COATED ORAL at 15:44

## 2019-05-22 RX ADMIN — SODIUM CHLORIDE, POTASSIUM CHLORIDE, SODIUM LACTATE AND CALCIUM CHLORIDE 500 ML: 600; 310; 30; 20 INJECTION, SOLUTION INTRAVENOUS at 04:07

## 2019-05-22 RX ADMIN — PROCHLORPERAZINE MALEATE 10 MG: 10 TABLET, FILM COATED ORAL at 19:24

## 2019-05-22 ASSESSMENT — ACTIVITIES OF DAILY LIVING (ADL)
ADLS_ACUITY_SCORE: 21

## 2019-05-22 ASSESSMENT — MIFFLIN-ST. JEOR: SCORE: 1605.46

## 2019-05-22 ASSESSMENT — PAIN DESCRIPTION - DESCRIPTORS
DESCRIPTORS: SORE
DESCRIPTORS: SORE

## 2019-05-22 NOTE — PHARMACY-CONSULT NOTE
D/I: 23 year old female s/p kidney. donation surgery on 5/21/19.  Medications have been reviewed by the pharmacist for efficacy, appropriate dose, medication interactions and potential adverse effects.      A: Medications reviewed for this patient as above. No medication issues were noted.  P: Pharmacy will continue to monitor for any potential medication issues, and will make recommendations as appropriate. Medication therapy needs for discharge planning will continue to be addressed throughout the current admission via multidisciplinary rounds and order review.

## 2019-05-22 NOTE — TELEPHONE ENCOUNTER
Donor selection committee met today to review Kinjal's candidacy for living kidney donation.  Her psychosocial evaluation was reviewed and mental health history discussed.  Recommendations are for her to reconnect with there primary MD who prescribes her antidepressant to make sure he/she is in support of her not taking it - she tells me she hasn't taken it for close to one year.  We also asked her to reconnect with her therapist for updated evaluation and ongoing treatment.  We would be able to reconsider her for donation in six months if she has mental health stability with an absence of suicidal ideation and healthy coping skills in tack.  I talked with Kinjal today and reviewed our recommendation which she seems open to.  I will remain available to discuss further psychosocial and advocacy needs, as they arise.  I also let her know that her coordinator will connect with her today to review any other medical findings.

## 2019-05-22 NOTE — PLAN OF CARE
Kinjal appeared to sleep well between cares. Spoke to the surgery cross cover at 0300 due to low urine volumes. LR 500cc bolus given. Also encouraged Kinjal to drink as much as possible. Did medicate x1  with zofran for nausea. Towards shift end she stated her nausea was better and drank at least a pitcher of water. Salinas ultimately drained 450cc of urine and did pull it this morning at 0630. Again encouraged her to drink as much as possible and I explained to her how to order breakfast. Continues on capnography and numbers have been WNL. 1 liam in placed drained approx. 30cc of serousanguinous  and dermabond remains intact with no drainage to incision site. Told her to call when needing to go to the bathroom for a standby assist. She stated that she will and also continued to work on her IS as much as possible. Will continue to monitor and report any significant changes.

## 2019-05-22 NOTE — PHARMACY-ADMISSION MEDICATION HISTORY
Admission medication history interview status for the 5/21/2019 admission is complete. See Epic admission navigator for allergy information, pharmacy, prior to admission medications and immunization status.     Medication history interview sources:  Patient, insurance fill history    Changes made to PTA medication list (reason)  Added: rizatriptan, olopatadine, ondansetron, promethazine, vitamin D  Deleted: Topirimate (provider discontinued due to kidney stone risk about 6 months ago)  Changed: N/A    Additional medication history information (including reliability of information, actions taken by pharmacist):  -Patient knew medication names and how she uses them. She also knew many of her doses.   -Completed course of diclofenac  Two weeks ago  -Patient no longer needs inhalers.   -Patient uses promethazine, rizatriptan, and ondansetron at start of migraine      Prior to Admission medications    Medication Sig Last Dose Taking? Auth Provider   cetirizine (ZYRTEC) 10 MG tablet Take 10 mg by mouth daily 5/21/2019 at Unknown time Yes Reported, Patient   DULoxetine (CYMBALTA) 20 MG capsule Take 20 mg by mouth daily  5/21/2019 at 0430 Yes Reported, Patient   olopatadine (PATADAY) 0.2 % ophthalmic solution Place 1 drop into both eyes every 6 hours as needed for allergy Past Week at Unknown time Yes Unknown, Entered By History   omeprazole (PRILOSEC) 20 MG CR capsule Take 20 mg by mouth daily Past Week at Unknown time Yes Reported, Patient   ondansetron (ZOFRAN-ODT) 4 MG ODT tab Take 4 mg by mouth every 8 hours as needed for nausea 5/8/2019 at Unknown time Yes Unknown, Entered By History   promethazine (PHENERGAN) 25 MG tablet Take 25 mg by mouth every 6 hours as needed for nausea (At start of migraine) 5/8/2019 Yes Unknown, Entered By History   rizatriptan (MAXALT-MLT) 10 MG ODT Take 10 mg by mouth at onset of headache for migraine 5/8/2019 Yes Unknown, Entered By History   vitamin D3 (CHOLECALCIFEROL) 2000 units (50  mcg) tablet Take 1 tablet by mouth daily 5/21/2019 at Unknown time Yes Unknown, Entered By History         Medication history completed by: Evan Beaver PharmD 4 Student

## 2019-05-22 NOTE — PROGRESS NOTES
I visited the patient in the 7A room.  The patient was laying in bed awake.  The patient reports good pain control today and denies nausea.    The patient had finnegan catheter removed and has urinated without difficulty.  The patient reports going for walks.    The patient has passed gas  The patient is able to drink liquid and has eaten small amounts of food.  Discharge plan: Likely tomorrow  I gave the patient the education sheet of the milestones for discharge and things to expect after donation.  Organ specific education completed, and discharge planning has been conducted with multidisciplinary transplant care team including physicians, pharmacy, nutrition, and social work.   I asked her if she wanted to know how her anonymous recipient is doing, she stated yes.  I told her that her donated kidney is functioning and the patient is doing well.  I will call her after she is discharged to check on her recovery.

## 2019-05-22 NOTE — PLAN OF CARE
"  /65 (BP Location: Right arm)   Pulse 91   Temp 98.3  F (36.8  C) (Oral)   Resp 18   Ht 1.651 m (5' 5\")   Wt 82.8 kg (182 lb 8.7 oz)   SpO2 92%   BMI 30.38 kg/m      [id number]  Kinjalharrison Crain transferred to 7A from PACU after kidney donation (R kidney). HR 90s-101, OVSS on 0.5-1L O2 via NC; on Capno; readings WNL, no s/s of distress. A/Ox4 while awake, slept intermittently through shift. Pt's significant other, Clarice, at bedside through most of the evening and very supportive of pt and with cares. Pt endorsed incisional pain--treated with prn IV dilaudid x1 with adequate relief; encouraged use of PO oxy as well but pt seems very hesitant to use pain medications; education reinforced. Lap sites x4, liquid bandaged, c/d/i; abdominal binder in place. Pt up to walk around unit x1 this shift and tolerated well with minimal assistance, just used IV pole for some slight support. Endorsed intermittent nausea--given prn zofran ODT x1 and prn iv compazine x1 with relief; also has scopolamine patch behind left ear and seabands in place. Essential oils also in use with relief. Pt on regular diet but started on clear liquids tonight--taking sips of water and gatorade; and had one raspberry ice; no further appetite. Finnegan patent with 350 yellow UOP this shift; finnegan cares done. CAUTI education, IS, cough/deep breathe, and pain management education started and reinforced. Not passing gas, no BMs this shift; on bowel meds. Upper arm PIV SL. Lower arm PIV patent with MIV@60ml/hr. Continue to encourage activity, IS, pain management and PO intake. Pt sleeping now, since about 2100; SCDs on; call light and belongings within reach. Will continue to monitor and continue POC/notify team as needed.    "

## 2019-05-22 NOTE — PROGRESS NOTES
LIVING DONOR SOCIAL WORK AND INDEPENDENT LIVING DONOR ADVOCATE NOTE:  D:  Kinjal Araujo is a living kidney donor, POD 1.  I:  I met with her at bedside to thank her for kidney donation and to assess for any psychosocial needs and to assist with discharge planning.  I assessed the patient's mood/affect, plans for recovery, and any feelings of regret/remorse regarding donation.   A:  She is resting comfortably in bed on the unit.  She appears to be in a good mood and affect is congruent.  She states that pain is well controlled.  She is a NDD and reports that it is her understanding that a few transplants will be occurring because of her NDD.  She had originally planned to donate to a woman named April whom she'd hear about via social media.  April received a  donor transplant so Kinjal chose to move forward with NDD.  She has never met April but they have talked and April may be visiting her today in the hospital which Kinjal seems pleased about. Patient describes donation recovery as going well* so far.  I relayed that [facility] has sent her a check to help pay for her lost wages.    She and I discussed how to reach me should she have any post operative psychosocial needs.  She reports no feelings of regret or remorse about donation.  She denies any discharge planning needs at this time.  Patient plans to discharge to home with the care and support of her family.   P: Donor /JERMAIN will remain available to assist with any psychosocial and advocacy needs, both inpatient and outpatient, as needed.  Patient is aware of follow-up recommendations and has my contact information.

## 2019-05-22 NOTE — PROGRESS NOTES
"REGIONAL ANESTHESIA PAIN SERVICE  SUBJECTIVE  Interval history: Patient reports abdominal incisional pain tolerable, ambulated in halls and currently rating abdominal pain 6-7/10 and improved comfort with oxycodone PRN and scheduled Tylenol (see below).  Tolerating regular diet,  denies nausea.  Denies any weakness, paresthesias, circumoral numbness, metallic taste or tinnitus.        Medications related to Pain Management (From now, onward)    Start     Dose/Rate Route Frequency Ordered Stop    05/22/19 0800  DULoxetine (CYMBALTA) DR capsule 20 mg      20 mg Oral DAILY 05/21/19 1521 05/21/19 2000  senna-docusate (SENOKOT-S/PERICOLACE) 8.6-50 MG per tablet 1 tablet      1 tablet Oral 2 TIMES DAILY 05/21/19 1521 05/21/19 2000  senna-docusate (SENOKOT-S/PERICOLACE) 8.6-50 MG per tablet 2 tablet      2 tablet Oral 2 TIMES DAILY 05/21/19 1521 05/21/19 1600  acetaminophen (TYLENOL) tablet 975 mg      975 mg Oral EVERY 8 HOURS 05/21/19 1521 05/24/19 1559    05/21/19 1521  bupivacaine liposome (EXPAREL) LONG ACTING injection was administered into the infiltration site to produce postsurgical analgesia. Duration of action is up to 72 hours, and other \"cem\" medications should not be given for 96 hours with the exception of the lidocaine 5% patch (LIDODERM) and the lidocaine 10mg in potassium infusions. This entry is for INFORMATION ONLY.       Does not apply CONTINUOUS PRN 05/21/19 1521 05/25/19 1520    05/21/19 1521  oxyCODONE (ROXICODONE) tablet 5-10 mg      5-10 mg Oral EVERY 3 HOURS PRN 05/21/19 1521      05/21/19 1521  HYDROmorphone (PF) (DILAUDID) injection 0.3-0.5 mg      0.3-0.5 mg Intravenous EVERY 1 HOUR PRN 05/21/19 1521            OBJECTIVE:    /78 (BP Location: Right arm)   Pulse 83   Temp 98.4  F (36.9  C) (Oral)   Resp 16   Ht 1.651 m (5' 5\")   Wt 85.5 kg (188 lb 6.4 oz)   SpO2 94%   BMI 31.35 kg/m    Exam:  General: alert and no distress  Neuro: Strength BLE " 5/5    ASSESSMENT/PLAN:    Kinjal Crain is a 23 year old female kidney donor who is now POD #1 s/p NEPHRECTOMY, NONDIRECTED DONOR, HAND-ASSISTED, LAPAROSCOPIC with single shot injection bilateral transversus abdominis plane (TAP) nerve block.  Total bupivacaine 0.25% 20 mL and liposomal (long-acting) bupivacaine (Exparel) 1.3% 20 mL administered 5/21/19 for postop pain control.  No weakness or paresthesias.  No evidence of adverse side effects associated with nerve block injections.  Acheiving adequate pain control, with nerve block and multimodal analgesia.  Anticipate 48-72 hours of pain control with long-acting local anesthetic.     - NO other local anesthetic use within 96 hours of liposomal bupivacaine (Exparel), unless approved by [facility]APS  - patient received verbal and written instructions about liposomal bupivacaine and counseling about pharmacologic and nonpharmacologic measures for acute postoperative pain management  - please call [facility] if questions or concerns    CORTNEY Diaz [facility]  Regional Anesthesia Pain Service ([facility]APS)  5/22/2019 11:26 AM    RAPS Contact Info (for in-house use only):  Job code ID: [email or web address] [id number]   [email or web address]  [facility] 0602  [employer] phone: dial [phone], enter jobcode ID, then enter call-back number.    Text: Use [employer] on the Intranet <Paging/Directory> tab and enter Jobcode ID.   If no call back at any time, contact the hospital  and ask for RAPS attending or backup

## 2019-05-22 NOTE — PROGRESS NOTES
Transplant Surgery  Inpatient Daily Progress Note  05/22/2019    Assessment & Plan:Kinjal Crain is a 23 year old White female who has PMH significant for Migraine, GERD, DUB s/p hysterectomy.  She is now POD #1  from a donor nephrectomy.      Hematology: MADELEINE's. Hgb stable 13  Postoperative Pain: Controlled, Continue Tylenol 975mg Q8, oxycodone 5-10 Q3, dilaudid 0.3-0.5 Q1 PRN  Cardiorespiratory:  Hypotensive-Stable.  1LNC-wean as tolerated, aggressive CDB and IS  GI/Nutrition: CLD-ADAT Regular  Nausea: Continue antiemetics-zofran, compazine, reglan  Continue bowel regimen, dulcolax  Continue prilosec  Fluid/Electrolytes: MIVF:D5LR @ 60. Received LR 500cc Bolus overnight. CIV today with good po.  : Salinas in place for accurate I/O's, to be discontinued today  Infectious disease:Afebrile.   Prophylaxis: DVT, GI, encourage ambulation  Disposition: 7A    Medical Decision Making: Medium  Subsequent visit 03896 (moderate level decision making)    MIREYA/Fellow/Resident Provider: Perla Rossi, #[id number]    Faculty: Britney Hardy MD  _________________________________________________________________  Transplant History: Admitted 5/21/2019 for donor nephrectomy.  5/21/2019 (Kidney), Postoperative day:  1    Interval History: History is obtained from the patient  Overnight events: c/o nausea without vomiting. Reports common with anesthesia for patient. Tolerating po narcotics. Ambulating.     ROS:   A 10-point review of systems was negative except as noted above.    Meds:    acetaminophen  975 mg Oral Q8H     cetirizine  10 mg Oral Daily     DULoxetine  20 mg Oral Daily     heparin  5,000 Units Subcutaneous Q12H     omeprazole  20 mg Oral Daily     scopolamine  1 patch Transdermal Q72H    And     scopolamine   Transdermal Q8H    And     [START ON 5/24/2019] scopolamine   Transdermal Q72H     senna-docusate  1 tablet Oral BID    Or     senna-docusate  2 tablet Oral BID     topiramate  25 mg Oral BID       Physical  "Exam:     Admit Weight: 82.8 kg (182 lb 8.7 oz)    Current vitals:   /60 (BP Location: Right arm)   Pulse 83   Temp 97.7  F (36.5  C) (Oral)   Resp 16   Ht 1.651 m (5' 5\")   Wt 82.8 kg (182 lb 8.7 oz)   SpO2 97%   BMI 30.38 kg/m           Vital sign ranges:    Temp:  [97.4  F (36.3  C)-98.6  F (37  C)] 97.7  F (36.5  C)  Pulse:  [] 83  Heart Rate:  [] 83  Resp:  [16-22] 16  BP: ()/(49-81) 107/60  SpO2:  [92 %-100 %] 97 %  Patient Vitals for the past 24 hrs:   BP Temp Temp src Pulse Heart Rate Resp SpO2   05/22/19 0336 107/60 97.7  F (36.5  C) Oral -- 83 16 97 %   05/21/19 2355 101/59 98.6  F (37  C) Oral 83 -- 16 95 %   05/21/19 2000 -- -- -- -- -- -- 93 %   05/21/19 1941 109/65 98.3  F (36.8  C) Oral 91 91 18 92 %   05/21/19 1521 106/64 98.4  F (36.9  C) Oral 101 -- 18 96 %   05/21/19 1500 107/69 -- -- 102 -- 18 95 %   05/21/19 1445 110/63 -- -- -- -- 19 95 %   05/21/19 1430 101/67 -- -- 102 -- 20 94 %   05/21/19 1415 119/78 -- -- -- -- 22 96 %   05/21/19 1406 108/72 97.9  F (36.6  C) Oral 92 101 20 95 %   05/21/19 1400 108/72 -- -- 92 96 18 96 %   05/21/19 1345 102/75 -- -- -- 99 22 96 %   05/21/19 1330 103/70 -- -- 90 102 22 95 %   05/21/19 1315 (!) 89/56 -- -- -- 82 22 96 %   05/21/19 1300 101/49 -- -- 90 78 18 98 %   05/21/19 1245 91/70 -- -- -- 78 20 99 %   05/21/19 1230 90/63 98.3  F (36.8  C) Oral 76 70 21 100 %   05/21/19 1215 100/58 -- -- -- 67 16 100 %   05/21/19 1200 104/68 97.4  F (36.3  C) Oral 69 83 16 100 %   05/21/19 0710 91/57 -- -- 71 70 16 100 %   05/21/19 0705 117/81 -- -- 73 74 18 99 %       General Appearance: in no apparent distress.   Skin: normal, warm, dry  Heart: regular rate and rhythm  Lungs: clear to auscultation, without wheezes  Abdomen: The abdomen is soft, appropriately tender, slightly distended, incisions with dermabond. Small amount of ecchymosis to midline incision.   : finnegan is present. Urine has no gross hematuria.   Extremities: edema: " absent.   Neurologic: awake, alert and oriented.     Data:   CMP  Recent Labs   Lab 05/20/19  1055   CR 0.70   GFRESTIMATED >90   GFRESTBLACK >90     CBC  Recent Labs   Lab 05/21/19  1244 05/20/19  1055   HGB 12.9 15.2     COAGSNo lab results found in last 7 days.    Invalid input(s): XA   Urinalysis  Recent Labs   Lab Test 05/20/19  1112 07/20/18  0723   COLOR Straw Yellow   APPEARANCE Clear Slightly Cloudy   URINEGLC Negative Negative   URINEBILI Negative Negative   URINEKETONE Negative Negative   SG 1.008 1.016   UBLD Negative Negative   URINEPH 7.0 5.0   PROTEIN Negative Negative   NITRITE Negative Negative   LEUKEST Negative Small*   RBCU 0 2   WBCU 0 6*   UTPG  --  0.11

## 2019-05-22 NOTE — PLAN OF CARE
AVSS, reporting pain, and states relief with scheduled tylenol and prn oxycodone x1.  Patient up ambulating in lovett with minimal to no assist, voiding after finnegan dc'd this AM, no flatus or BM even after suppository, taking adequate oral intake with fair appetite (MIV dc'd).  Scheduled meds given as ordered.  Continue to monitor, treat as ordered, notify team with changes.

## 2019-05-23 ENCOUNTER — CARE COORDINATION (OUTPATIENT)
Dept: TRANSPLANT | Facility: CLINIC | Age: 24
End: 2019-05-23

## 2019-05-23 LAB — CK SERPL-CCNC: 187 U/L (ref 30–225)

## 2019-05-23 PROCEDURE — 12000026 ZZH R&B TRANSPLANT

## 2019-05-23 PROCEDURE — 25000132 ZZH RX MED GY IP 250 OP 250 PS 637: Performed by: NURSE PRACTITIONER

## 2019-05-23 PROCEDURE — 36415 COLL VENOUS BLD VENIPUNCTURE: CPT | Performed by: STUDENT IN AN ORGANIZED HEALTH CARE EDUCATION/TRAINING PROGRAM

## 2019-05-23 PROCEDURE — 25000131 ZZH RX MED GY IP 250 OP 636 PS 637: Performed by: STUDENT IN AN ORGANIZED HEALTH CARE EDUCATION/TRAINING PROGRAM

## 2019-05-23 PROCEDURE — 25000128 H RX IP 250 OP 636: Performed by: STUDENT IN AN ORGANIZED HEALTH CARE EDUCATION/TRAINING PROGRAM

## 2019-05-23 PROCEDURE — 82550 ASSAY OF CK (CPK): CPT | Performed by: STUDENT IN AN ORGANIZED HEALTH CARE EDUCATION/TRAINING PROGRAM

## 2019-05-23 PROCEDURE — 25000132 ZZH RX MED GY IP 250 OP 250 PS 637: Performed by: STUDENT IN AN ORGANIZED HEALTH CARE EDUCATION/TRAINING PROGRAM

## 2019-05-23 RX ORDER — POLYETHYLENE GLYCOL 3350 17 G/17G
17 POWDER, FOR SOLUTION ORAL DAILY
Status: DISCONTINUED | OUTPATIENT
Start: 2019-05-23 | End: 2019-05-24 | Stop reason: HOSPADM

## 2019-05-23 RX ORDER — OXYCODONE HYDROCHLORIDE 5 MG/1
5-10 TABLET ORAL EVERY 4 HOURS PRN
Status: DISCONTINUED | OUTPATIENT
Start: 2019-05-23 | End: 2019-05-24 | Stop reason: HOSPADM

## 2019-05-23 RX ORDER — BISACODYL 10 MG
10 SUPPOSITORY, RECTAL RECTAL ONCE
Status: DISCONTINUED | OUTPATIENT
Start: 2019-05-23 | End: 2019-05-24 | Stop reason: HOSPADM

## 2019-05-23 RX ADMIN — ONDANSETRON 4 MG: 4 TABLET, ORALLY DISINTEGRATING ORAL at 17:09

## 2019-05-23 RX ADMIN — OMEPRAZOLE 20 MG: 20 CAPSULE, DELAYED RELEASE ORAL at 07:43

## 2019-05-23 RX ADMIN — OXYCODONE HYDROCHLORIDE 10 MG: 5 TABLET ORAL at 04:10

## 2019-05-23 RX ADMIN — SENNOSIDES AND DOCUSATE SODIUM 2 TABLET: 8.6; 5 TABLET ORAL at 20:06

## 2019-05-23 RX ADMIN — BISACODYL 10 MG: 10 SUPPOSITORY RECTAL at 07:46

## 2019-05-23 RX ADMIN — OXYCODONE HYDROCHLORIDE 10 MG: 5 TABLET ORAL at 17:09

## 2019-05-23 RX ADMIN — DULOXETINE HYDROCHLORIDE 20 MG: 20 CAPSULE, DELAYED RELEASE ORAL at 09:08

## 2019-05-23 RX ADMIN — ACETAMINOPHEN 975 MG: 325 TABLET, FILM COATED ORAL at 00:24

## 2019-05-23 RX ADMIN — POLYETHYLENE GLYCOL 3350 17 G: 17 POWDER, FOR SOLUTION ORAL at 17:09

## 2019-05-23 RX ADMIN — OXYCODONE HYDROCHLORIDE 10 MG: 5 TABLET ORAL at 00:24

## 2019-05-23 RX ADMIN — HEPARIN SODIUM 5000 UNITS: 5000 INJECTION, SOLUTION INTRAVENOUS; SUBCUTANEOUS at 07:44

## 2019-05-23 RX ADMIN — ACETAMINOPHEN 975 MG: 325 TABLET, FILM COATED ORAL at 07:44

## 2019-05-23 RX ADMIN — OXYCODONE HYDROCHLORIDE 5 MG: 5 TABLET ORAL at 09:08

## 2019-05-23 RX ADMIN — ONDANSETRON 4 MG: 4 TABLET, ORALLY DISINTEGRATING ORAL at 07:43

## 2019-05-23 RX ADMIN — CETIRIZINE HYDROCHLORIDE 10 MG: 10 TABLET, FILM COATED ORAL at 07:43

## 2019-05-23 RX ADMIN — ACETAMINOPHEN 975 MG: 325 TABLET, FILM COATED ORAL at 16:19

## 2019-05-23 RX ADMIN — HEPARIN SODIUM 5000 UNITS: 5000 INJECTION, SOLUTION INTRAVENOUS; SUBCUTANEOUS at 20:06

## 2019-05-23 RX ADMIN — OXYCODONE HYDROCHLORIDE 5 MG: 5 TABLET ORAL at 13:23

## 2019-05-23 RX ADMIN — SENNOSIDES AND DOCUSATE SODIUM 2 TABLET: 8.6; 5 TABLET ORAL at 07:43

## 2019-05-23 ASSESSMENT — ACTIVITIES OF DAILY LIVING (ADL)
ADLS_ACUITY_SCORE: 19
ADLS_ACUITY_SCORE: 21
ADLS_ACUITY_SCORE: 20
ADLS_ACUITY_SCORE: 20

## 2019-05-23 ASSESSMENT — MIFFLIN-ST. JEOR: SCORE: 1596.84

## 2019-05-23 NOTE — PHARMACY-TRANSPLANT NOTE
[facility] Transplant Donor Prior to Discharge Note  23 year old female s/p kidney donation surgery on 5/21/2019.     Pharmacy has monitored for any potential medication issues.  In anticipation for discharge, medication therapy needs have been addressed daily throughout the current admission via multidisciplinary rounds and/or discussions, order verification, daily clinical pharmacy review, and communication with prescribers.    Max Vanegas RPH on 5/23/2019 at 11:36 AM

## 2019-05-23 NOTE — PLAN OF CARE
Gasper appears to sleep soundly between cares. Medicated x2 throughout the shift with good relief. Encouraged her to use her IS as much as possible throughout the night. When sitting up in the chair her sats are mid to high 90's. When in bed sats hover in the lower 90's Encouraged her to use her IS especially when in bed. 1 oxycodone insead of 2 . Abdominal incision CDI with dermabond intact. Did have one small hard pebly llike stool during the night. Encourage Kinjal to make sure she takes her bowel meds and encouraged he to drink as much as possil

## 2019-05-23 NOTE — PROGRESS NOTES
"  Transplant Surgery  Inpatient Daily Progress Note  05/23/2019    Assessment & Plan:Kinjal Crain is a 23 year old White female who has PMH significant for Migraine, GERD, DUB s/p hysterectomy.  She is now POD #2 after a donor nephrectomy.      Hematology: MADELEINE's. Hgb stable   Postoperative Pain: Controlled, Continue Tylenol 975mg Q8, oxycodone 5-10 Q4 PRN  Cardiorespiratory: VSS  GI/Nutrition: Regular diet  Nausea: Continue antiemetics, controlled on scopolamine and PRN oral zofran  Continue bowel regimen, dulcolax  Continue prilosec  Fluid/Electrolytes: Off IVF  : No acute issues.   Infectious disease:Afebrile.   Prophylaxis: DVT, GI, encourage ambulation  Disposition: 7A, possible discharge today if feeling well this afternoon    Medical Decision Making: Medium  Subsequent visit 13172 (moderate level decision making)    MIREYA/Fellow/Resident Provider: Sidra Gomez NP [id number]    Faculty: Britney Hardy MD  _________________________________________________________________  Transplant History: Admitted 5/21/2019 for donor nephrectomy.  5/21/2019 (Kidney), Postoperative day:  1    Interval History: History is obtained from the patient  Mild nausea, controlled with oral meds. Small hard stool x1. Able to eat breakfast.    ROS:   A 10-point review of systems was negative except as noted above.    Meds:    acetaminophen  975 mg Oral Q8H     cetirizine  10 mg Oral Daily     DULoxetine  20 mg Oral Daily     heparin  5,000 Units Subcutaneous Q12H     omeprazole  20 mg Oral Daily     scopolamine  1 patch Transdermal Q72H    And     scopolamine   Transdermal Q8H    And     [START ON 5/24/2019] scopolamine   Transdermal Q72H     senna-docusate  1 tablet Oral BID    Or     senna-docusate  2 tablet Oral BID       Physical Exam:     Admit Weight: 82.8 kg (182 lb 8.7 oz)    Current vitals:   /84 (BP Location: Right arm)   Pulse 84   Temp 98.5  F (36.9  C) (Oral)   Resp 16   Ht 1.651 m (5' 5\")   Wt 84.6 kg (186 lb " 8 oz)   SpO2 95%   BMI 31.04 kg/m      Vital sign ranges:    Temp:  [98.1  F (36.7  C)-98.9  F (37.2  C)] 98.5  F (36.9  C)  Pulse:  [84-92] 84  Heart Rate:  [75-99] 99  Resp:  [16-18] 16  BP: (101-122)/(63-86) 117/84  SpO2:  [92 %-96 %] 95 %  Patient Vitals for the past 24 hrs:   BP Temp Temp src Pulse Heart Rate Resp SpO2 Weight   05/23/19 0900 -- -- -- -- -- -- -- 84.6 kg (186 lb 8 oz)   05/23/19 0725 117/84 98.5  F (36.9  C) Oral -- 99 16 95 % --   05/23/19 0400 101/72 98.1  F (36.7  C) Oral -- 86 16 93 % --   05/23/19 0000 117/86 98.6  F (37  C) Oral 84 -- 16 96 % --   05/22/19 1917 118/69 98.4  F (36.9  C) Oral -- 86 18 94 % --   05/22/19 1558 122/80 98.4  F (36.9  C) Oral 92 -- 16 92 % --   05/22/19 1151 109/63 98.9  F (37.2  C) Oral -- 75 16 95 % --       General Appearance: in no apparent distress.   Skin: normal, warm, dry  Heart: well perfused  Lungs: RA, unlabored  Abdomen: The abdomen is soft, appropriately tender, incisions with dermabond. Small amount of ecchymosis to midline incision.   : No finnegan  Extremities: edema: absent.   Neurologic: awake, alert and oriented x4.     Data:   CMP  Recent Labs   Lab 05/22/19  0700 05/20/19  1055   BUN 17  --    CR 1.14* 0.70   GFRESTIMATED 67 >90   GFRESTBLACK 77 >90     CBC  Recent Labs   Lab 05/22/19  0700 05/21/19  1244   HGB 11.6* 12.9     --      COAGSNo lab results found in last 7 days.    Invalid input(s): XA   Urinalysis  Recent Labs   Lab Test 05/20/19  1112 07/20/18  0723   COLOR Straw Yellow   APPEARANCE Clear Slightly Cloudy   URINEGLC Negative Negative   URINEBILI Negative Negative   URINEKETONE Negative Negative   SG 1.008 1.016   UBLD Negative Negative   URINEPH 7.0 5.0   PROTEIN Negative Negative   NITRITE Negative Negative   LEUKEST Negative Small*   RBCU 0 2   WBCU 0 6*   UTPG  --  0.11

## 2019-05-23 NOTE — PLAN OF CARE
"/69 (BP Location: Right arm)   Pulse 92   Temp 98.4  F (36.9  C) (Oral)   Resp 18   Ht 1.651 m (5' 5\")   Wt 85.5 kg (188 lb 6.4 oz)   SpO2 94%   BMI 31.35 kg/m       VSS on RA, afebrile. Endorsed incisional pain, given PRN oxycodone 5 mg x 2 and scheduled tylenol with relief. Endorses nausea, given PRN ODT zofran 4 mg without relief. PRN compazine 10 mg given with relief. Sea bands in place. Scopalamine patch behind L ear. Regular diet, poor appetite - ate <25% dinner. Midline incision and lap sites dermabonded, open to air. No BM, +bowel sounds, not passing flatus. UO 1400 mL clear, yellow. L PIVs NS locked. Up independently, ambulated in halls x 2.  Continue to monitor and update donor team with acute changes.   "

## 2019-05-23 NOTE — PLAN OF CARE
"/81   Pulse 84   Temp 98.4  F (36.9  C) (Oral)   Resp 16   Ht 1.651 m (5' 5\")   Wt 84.6 kg (186 lb 8 oz)   SpO2 95%   BMI 31.04 kg/m     Neuro: intact  VS: VSS at RA  Pain: Controlled by PRN oxycodone and scheduled tylenol   GI: on regular diet with poor appetite, c/o nausea treated by PRN zofran PO and scopolamine patch. Constipated, Suppository given no effect yet.  Urine Output - voiding without difficulty.   Drains - PIVx2 SL  Activity - independent  Education - bowel/pain management  Plan of Care - discharging tomorrow to home.    "

## 2019-05-24 VITALS
RESPIRATION RATE: 16 BRPM | DIASTOLIC BLOOD PRESSURE: 85 MMHG | BODY MASS INDEX: 31.07 KG/M2 | OXYGEN SATURATION: 98 % | HEIGHT: 65 IN | SYSTOLIC BLOOD PRESSURE: 126 MMHG | TEMPERATURE: 99.1 F | WEIGHT: 186.5 LBS | HEART RATE: 68 BPM

## 2019-05-24 PROCEDURE — 25000131 ZZH RX MED GY IP 250 OP 636 PS 637: Performed by: STUDENT IN AN ORGANIZED HEALTH CARE EDUCATION/TRAINING PROGRAM

## 2019-05-24 PROCEDURE — 25000132 ZZH RX MED GY IP 250 OP 250 PS 637: Performed by: NURSE PRACTITIONER

## 2019-05-24 PROCEDURE — 25000132 ZZH RX MED GY IP 250 OP 250 PS 637: Performed by: STUDENT IN AN ORGANIZED HEALTH CARE EDUCATION/TRAINING PROGRAM

## 2019-05-24 RX ORDER — BISACODYL 10 MG
10 SUPPOSITORY, RECTAL RECTAL ONCE
Status: COMPLETED | OUTPATIENT
Start: 2019-05-24 | End: 2019-05-24

## 2019-05-24 RX ORDER — POLYETHYLENE GLYCOL 3350 17 G/17G
17 POWDER, FOR SOLUTION ORAL DAILY
Qty: 20 PACKET | Refills: 0 | Status: SHIPPED | OUTPATIENT
Start: 2019-05-25

## 2019-05-24 RX ORDER — ONDANSETRON 4 MG/1
4 TABLET, ORALLY DISINTEGRATING ORAL EVERY 6 HOURS PRN
Qty: 20 TABLET | Refills: 0 | Status: SHIPPED | OUTPATIENT
Start: 2019-05-24

## 2019-05-24 RX ORDER — OXYCODONE HYDROCHLORIDE 5 MG/1
5-10 TABLET ORAL EVERY 4 HOURS PRN
Qty: 20 TABLET | Refills: 0 | Status: SHIPPED | OUTPATIENT
Start: 2019-05-24

## 2019-05-24 RX ADMIN — ONDANSETRON 4 MG: 4 TABLET, ORALLY DISINTEGRATING ORAL at 11:48

## 2019-05-24 RX ADMIN — BISACODYL 10 MG: 10 SUPPOSITORY RECTAL at 11:44

## 2019-05-24 RX ADMIN — OXYCODONE HYDROCHLORIDE 10 MG: 5 TABLET ORAL at 00:21

## 2019-05-24 RX ADMIN — ONDANSETRON 4 MG: 4 TABLET, ORALLY DISINTEGRATING ORAL at 00:19

## 2019-05-24 RX ADMIN — ACETAMINOPHEN 975 MG: 325 TABLET, FILM COATED ORAL at 07:21

## 2019-05-24 RX ADMIN — OXYCODONE HYDROCHLORIDE 5 MG: 5 TABLET ORAL at 08:21

## 2019-05-24 RX ADMIN — SENNOSIDES AND DOCUSATE SODIUM 2 TABLET: 8.6; 5 TABLET ORAL at 07:21

## 2019-05-24 RX ADMIN — CETIRIZINE HYDROCHLORIDE 10 MG: 10 TABLET, FILM COATED ORAL at 07:20

## 2019-05-24 RX ADMIN — ACETAMINOPHEN 975 MG: 325 TABLET, FILM COATED ORAL at 00:20

## 2019-05-24 RX ADMIN — OXYCODONE HYDROCHLORIDE 5 MG: 5 TABLET ORAL at 11:48

## 2019-05-24 RX ADMIN — POLYETHYLENE GLYCOL 3350 17 G: 17 POWDER, FOR SOLUTION ORAL at 07:21

## 2019-05-24 RX ADMIN — OMEPRAZOLE 20 MG: 20 CAPSULE, DELAYED RELEASE ORAL at 07:21

## 2019-05-24 RX ADMIN — DULOXETINE HYDROCHLORIDE 20 MG: 20 CAPSULE, DELAYED RELEASE ORAL at 07:22

## 2019-05-24 ASSESSMENT — ACTIVITIES OF DAILY LIVING (ADL)
ADLS_ACUITY_SCORE: 20
ADLS_ACUITY_SCORE: 19

## 2019-05-24 NOTE — PLAN OF CARE
DISCHARGE:  Patient with orders to discharge to her home.     Education Provided:   Med Card n/a  Lab Book n/a  Handouts discharge instruction  Specialty Pharmacy n/a  LDAs PIV removed    Discharge instructions, medications & follow ups reviewed with pt and her mother. Copy of discharge summary given to pt. PIV removed.   Patient in stable condition. AVSS. pt had no further questions regarding discharge instructions and medications. Patient transferred out by her mother. Education: pain/bowel management. Pt refused to take suppository prior to discharge because she was concerned about BM on her way home, supp given to pt to take when she gets home

## 2019-05-24 NOTE — DISCHARGE SUMMARY
[facility], [place]    Discharge Summary  Solid Organ Transplant Surgery    Date of Admission:  5/21/2019  Date of Discharge:  5/24/2019  Date of Service (when I saw the patient): 05/24/19    Discharge Diagnoses   Active Problems:    Encounter for donation of kidney      Procedure/Surgery Information   Procedure: Procedure(s):  Laparoscopic Hand Assisted Right Donor Nephrectomy,  Living Non Directed   Surgeon(s): Surgeon(s) and Role:     * Britney Dial MD - Primary     * José Miguel Bermudez MD - Assisting     * Marshall Bennett MD - Fellow - Assisting             History of Present Illness   Kinjal Crain is a 23 year old female who presented for nephrectomy for kidney donation.    Hospital Course   Kinjal Crain was admitted on 5/21/2019 and underwent nephrectomy for kidney donation. On POD #3 she was tolerating a regular diet, had return of bowel function, ambulating independently, and pain controlled on oral regimen and therefore felt appropriate for discharge home.     Perla Rossi    Post-operative pain control: included keterolac x1, bupivacaine block, oxycodone, and Tylenol and will be Oxycodone and Tylenol on discharge.  The patient was instructed to avoid NSAID medications.    Discharge Disposition   Discharged to home   Condition at discharge: Good    Primary Care Physician   Provider Not In System    Physical Exam   Temp: 99.1  F (37.3  C) Temp src: Axillary BP: 126/85 Pulse: 68 Heart Rate: 68 Resp: 16 SpO2: 98 % O2 Device: None (Room air)    Vitals:    05/21/19 0523 05/22/19 0900 05/23/19 0900   Weight: 82.8 kg (182 lb 8.7 oz) 85.5 kg (188 lb 6.4 oz) 84.6 kg (186 lb 8 oz)     Vital Signs with Ranges  Temp:  [98.4  F (36.9  C)-99.1  F (37.3  C)] 99.1  F (37.3  C)  Pulse:  [68-95] 68  Heart Rate:  [68-97] 68  Resp:  [16-19] 16  BP: (112-129)/(74-91) 126/85  SpO2:  [91 %-98 %] 98 %  I/O last 3 completed shifts:  In: 920 [P.O.:920]  Out: 2200  [Urine:2200]      General Appearance: in no apparent distress.   Skin: normal, warm, dry  Heart: well perfused  Lungs: RA, unlabored  Abdomen: The abdomen is soft, appropriately tender, incisions with dermabond. Small amount of ecchymosis to midline incision.   : No finnegan  Extremities: edema: absent.   Neurologic: awake, alert and oriented x4.          Consultations This Hospital Stay   PHARMACY IP CONSULT  SOT MEDICATION HISTORY IP PHARMACY CONSULT    Time Spent on this Encounter   I have spent greater than 30 minutes on this discharge.    Discharge Orders      Reason for your hospital stay    Kidney Donation surgery     Adult [facility] [[email or web address] Follow-up and recommended labs and tests    Follow up with Dr. Dial in Transplant Clinic in 2-3 weeks.  If you need to change your appointment please contact your coordinator at [phone].    Appointments on [place] and/or [facility] (with [facility] [email or web address]] or [facility] provider or service). Call [phone] if you haven't heard regarding these appointments within 7 days of discharge.     Activity    Don't lift anything heavier than 10 pounds for 6 weeks (or longer if your surgeon has discussed this with you)..   Walk regularly.    OK to drive only after no longer taking narcotics AND you feel comfortable working the breaks/clutch suddenly if needed.  Limit sports and strenuous activities/'core' exercises for 6 weeks.  If you experience pain after exertion, try an ice pack or warm pack to the area.   Wear abdominal binder for comfort if you desire, but only when out of bed.    You have been instructed to avoid NSAIDs (ibuprofen/aspirin like medications) as these medications may affect the remaining kidney. Take other medications as prescribed.    Keep narcotics out of reach of children. When finished with them, please destroy/discard any remaining pills responsibly. Often, your county government office, local police station or pharmacy will have a drug deposit box.     When to  contact your care team    Your transplant coordinator if you have any of the following:   Swelling, oozing, worsening pain, unusual redness around the incision, or if:  Fever of 101.5 F or higher   Increasing abdominal pain   Nausea or vomiting   Severe diarrhea, bloating, or constipation     Any concerns or questions, please call your Transplant coordinator:    Phone: [phone].  If they are not available, the on call coordinator/MD will help you with your concern.     Wound care and dressings    OK to shower. Gently wash around your incisions with soap and water.   Don't bathe/submerge incisions until glue is off and any openings are closed.  Wear loose-fitting clothes. This will help you be more comfortable and cause less irritation around your incisions.     Full Code     Diet    Keep yourself well hydrated (goal 1500 ml/day).   Eat lightly the first week as tolerated and avoid constipating foods.  If you are constipated, take a stool softener like Senna/Colace/Dulcolax/Miralax.     Discharge Medications   Current Discharge Medication List      START taking these medications    Details   oxyCODONE (ROXICODONE) 5 MG tablet Take 1-2 tablets (5-10 mg) by mouth every 4 hours as needed for severe pain (Start with 5mg dose)  Qty: 20 tablet, Refills: 0    Associated Diagnoses: Transplant donor evaluation      polyethylene glycol (MIRALAX/GLYCOLAX) packet Take 17 g by mouth daily  Qty: 20 packet, Refills: 0    Associated Diagnoses: Encounter for donation of kidney         CONTINUE these medications which have CHANGED    Details   ondansetron (ZOFRAN-ODT) 4 MG ODT tab Take 1 tablet (4 mg) by mouth every 6 hours as needed for nausea or vomiting  Qty: 20 tablet, Refills: 0    Associated Diagnoses: Transplant donor evaluation         CONTINUE these medications which have NOT CHANGED    Details   cetirizine (ZYRTEC) 10 MG tablet Take 10 mg by mouth daily      DULoxetine (CYMBALTA) 20 MG capsule Take 20 mg by mouth daily        olopatadine (PATADAY) 0.2 % ophthalmic solution Place 1 drop into both eyes every 6 hours as needed (allergy)       omeprazole (PRILOSEC) 20 MG CR capsule Take 20 mg by mouth daily      promethazine (PHENERGAN) 25 MG tablet Take 25 mg by mouth every 6 hours as needed for nausea (At start of migraine)      rizatriptan (MAXALT-MLT) 10 MG ODT Take 10 mg by mouth at onset of headache for migraine      vitamin D3 (CHOLECALCIFEROL) 2000 units (50 mcg) tablet Take 1 tablet by mouth daily           Allergies   Allergies   Allergen Reactions     Indocin [Indomethacin]      Dizziness       Norco [Hydrocodone-Acetaminophen] Nausea and Vomiting     Penicillins Hives and Difficulty breathing     Allergy when an infant  Per patient: throat closing     Latex Rash     Sumatriptan Palpitations     Data   Results for orders placed or performed during the hospital encounter of 05/21/19   POC US GUIDANCE NEEDLE PLACEMENT    Impression    Bilateral TAP blocks     Most Recent 3 BMP's:  Recent Labs   Lab Test 05/22/19  0700 05/20/19  1055 07/20/18  0735   NA  --   --  138   POTASSIUM  --   --  3.8   CHLORIDE  --   --  107   CO2  --   --  23   BUN 17  --  17   CR 1.14* 0.70 0.75   ANIONGAP  --   --  9   HUNTER  --   --  8.8   GLC  --   --  98     Most Recent 3 INR's:  Recent Labs   Lab Test 07/20/18  0735   INR 0.98     Most Recent 3 Hemoglobins:  Recent Labs   Lab Test 05/22/19  0700 05/21/19  1244 05/20/19  1055   HGB 11.6* 12.9 15.2

## 2019-05-24 NOTE — PLAN OF CARE
"  /86 (BP Location: Left arm)   Pulse 84   Temp 98.4  F (36.9  C) (Oral)   Resp 17   Ht 1.651 m (5' 5\")   Wt 84.6 kg (186 lb 8 oz)   SpO2 93%   BMI 31.04 kg/m      [id number]  VSS on RA, no s/s of distress. A/Ox4, pleasant/quiet; cooperative with cares. Incisional pain treated with prn oxy x1; lap sites x4, c/d/I, liquid bandaged; abdominal binder on while OOB. Endorsed intermittent nausea--scopolamine patch in place behind left ear; prn zofran ODT given x1 with relief of symptoms. Pt had fair appetite--on regular diet and only ate about 25% of her dinner; states still feeling constipated. Up ad claude in room and walked halls x2 this evening; tolerating activity well. Voiding adequate amounts of yellow urine; no BMs this shift--on bowel meds and miralax ordered/given this shift to promote bowel motility. PIV SL. Pt likely to discharge tomorrow. Pt spent most of shift in chair, watching television or up ambulating in room/halls; pt resting now/sleeping, call light and belongings within reach; will continue to monitor.   "

## 2019-05-24 NOTE — PROGRESS NOTES
"Visitrd with Kinjal on 7A.  She was resting in bed comfortably.  She offered that she had good pain control.  Surgery was as expected.  I thanked her for her gift.  She anticipated going home on Friday morning.  She stated she had caregivers available.  Reviewed \" What to expect after donation\"    Discharge needs assessed and discharge planning has been conducted with the multidisciplinary transplant care team including pharmacy, nutrition, and social work and transplant coordinator.  "

## 2019-05-30 ENCOUNTER — TELEPHONE (OUTPATIENT)
Dept: TRANSPLANT | Facility: CLINIC | Age: 24
End: 2019-05-30

## 2019-05-30 NOTE — TELEPHONE ENCOUNTER
The donor reports doing well following discharge from the hospital.    The patient denies any issues with having bowel movements and is urinating without difficulty.  The patient reports good pain control.  Wound is dry and intact.  The patient reports good appetite.  The patient reports good activity level.  We reviewed plan for 6 week labs: she will take her order locally.  She is aware of 6/11 post clinic visit with Dr Dial.  I updated her that the recipient is doing well.

## 2019-06-11 ENCOUNTER — OFFICE VISIT (OUTPATIENT)
Dept: TRANSPLANT | Facility: CLINIC | Age: 24
End: 2019-06-11
Attending: SURGERY
Payer: COMMERCIAL

## 2019-06-11 ENCOUNTER — MYC MEDICAL ADVICE (OUTPATIENT)
Dept: TRANSPLANT | Facility: CLINIC | Age: 24
End: 2019-06-11

## 2019-06-11 VITALS
HEART RATE: 98 BPM | WEIGHT: 184.2 LBS | SYSTOLIC BLOOD PRESSURE: 120 MMHG | OXYGEN SATURATION: 99 % | DIASTOLIC BLOOD PRESSURE: 86 MMHG | BODY MASS INDEX: 30.65 KG/M2

## 2019-06-11 DIAGNOSIS — Z00.5 TRANSPLANT DONOR EVALUATION: ICD-10-CM

## 2019-06-11 DIAGNOSIS — Z52.4 KIDNEY DONOR: Primary | ICD-10-CM

## 2019-06-11 LAB
ALBUMIN UR-MCNC: NEGATIVE MG/DL
AMORPH CRY #/AREA URNS HPF: ABNORMAL /HPF
APPEARANCE UR: CLEAR
BILIRUB UR QL STRIP: NEGATIVE
COLOR UR AUTO: YELLOW
GLUCOSE UR STRIP-MCNC: NEGATIVE MG/DL
HGB UR QL STRIP: NEGATIVE
KETONES UR STRIP-MCNC: NEGATIVE MG/DL
LEUKOCYTE ESTERASE UR QL STRIP: NEGATIVE
NITRATE UR QL: NEGATIVE
PH UR STRIP: 7 PH (ref 5–7)
RBC #/AREA URNS AUTO: 1 /HPF (ref 0–2)
SOURCE: ABNORMAL
SP GR UR STRIP: 1.01 (ref 1–1.03)
SQUAMOUS #/AREA URNS AUTO: 1 /HPF (ref 0–1)
UROBILINOGEN UR STRIP-MCNC: 0 MG/DL (ref 0–2)
WBC #/AREA URNS AUTO: <1 /HPF (ref 0–5)

## 2019-06-11 PROCEDURE — 81001 URINALYSIS AUTO W/SCOPE: CPT | Performed by: SURGERY

## 2019-06-11 PROCEDURE — G0463 HOSPITAL OUTPT CLINIC VISIT: HCPCS | Mod: ZF

## 2019-06-11 ASSESSMENT — PAIN SCALES - GENERAL: PAINLEVEL: MILD PAIN (2)

## 2019-06-11 NOTE — LETTER
6/11/2019      RE: Kinjal Crain  [address] [address]       Chief Complaint   Patient presents with     RECHECK     2-3 week post donor     Blood pressure 120/86, pulse 98, weight 83.6 kg (184 lb 3.2 oz), SpO2 99 %.    Jacy Gilliam [facility]      Transplant Surgery Kidney Donor Progress Note     Medical record number: [id number]  YOB: 1995,   Date of Visit:  06/11/2019  For followup after kidney donation.    Assessment and Recommendations: Ms. Crain is doing well after kidney donation.     1. Lifting restrictions: 10 lbs until 6 weeks post donation.  2. Followup: 6 weeks, 1 yr and 2 yr  3. Return to work to be determined per patient's progress, expected between 6-8 weeks after surgery.  4.  Pt would like to know if she can restart her Topamax for her migraines.  She was told to stop it prior donation and the Cymbalta she's on is not as effective.  I will check with the pharmacist and get back to her.  5.  UA with micro and culture due to pressure she's feeling with urination.    Total time: 15 minutes  Counselling time: 10 minutes      Britney Dial MD FACS  Assistant Professor of Surgery  Director, Living Kidney Donor Program.    ------------------------------------------------------------------------------------------    S: Ms. Crain donate a kidney 2 weeks ago and is doing well, reporting no fevers, chills, dysuria.  She is not taking narcotic analgesia.  She is not constipated.  She is mostly back to routine activities of daily living and is not feeling ready to return to work at this time.  She does feel some pressure and pain with urination but not burning like she has had in the past with UTI's.    Medications:  Prescription Medications as of 6/11/2019       Rx Number Disp Refills Start End Last Dispensed Date Next Fill Date Owning Pharmacy    cetirizine (ZYRTEC) 10 MG tablet            Sig: Take 10 mg by mouth daily    Class: Historical    Route: Oral    DULoxetine  (CYMBALTA) 20 MG capsule            Sig: Take 20 mg by mouth daily     Class: Historical    Route: Oral    olopatadine (PATADAY) 0.2 % ophthalmic solution            Sig: Place 1 drop into both eyes every 6 hours as needed (allergy)     Class: Historical    Route: Both Eyes    omeprazole (PRILOSEC) 20 MG CR capsule    6/22/2018        Sig: Take 20 mg by mouth daily    Class: Historical    Route: Oral    ondansetron (ZOFRAN-ODT) 4 MG ODT tab  20 tablet 0 5/24/2019    [facility] [address]    Sig: Take 1 tablet (4 mg) by mouth every 6 hours as needed for nausea or vomiting    Class: E-Prescribe    Route: Oral    oxyCODONE (ROXICODONE) 5 MG tablet  20 tablet 0 5/24/2019    [address]    Sig: Take 1-2 tablets (5-10 mg) by mouth every 4 hours as needed for severe pain (Start with 5mg dose)    Class: Local Print    Earliest Fill Date: 5/24/2019    Route: Oral    polyethylene glycol (MIRALAX/GLYCOLAX) packet  20 packet 0 5/25/2019    [address]    Sig: Take 17 g by mouth daily    Class: E-Prescribe    Route: Oral    promethazine (PHENERGAN) 25 MG tablet            Sig: Take 25 mg by mouth every 6 hours as needed for nausea (At start of migraine)    Class: Historical    Route: Oral    rizatriptan (MAXALT-MLT) 10 MG ODT            Sig: Take 10 mg by mouth at onset of headache for migraine    Class: Historical    Route: Oral    vitamin D3 (CHOLECALCIFEROL) 2000 units (50 mcg) tablet            Sig: Take 1 tablet by mouth daily    Class: Historical    Route: Oral          Exam:   Pulse:  [98] 98  BP: (120)/(86) 120/86  SpO2:  [99 %] 99 %  Appearance: in no apparent distress.   Skin: normal  Head and Neck: Normal, no rashes or jaundice  Respiratory: normal respiratory excursions, no audible wheeze  Cardiovascular: RRR  Abdomen: flat, No distention, incisions  C/D/I   Extremeties: Edema, none  Neuro: without deficit       Labs:   Admission on 05/21/2019, Discharged on 05/24/2019   Component Date Value Ref Range Status     Glucose 05/21/2019 105* 70 - 99 mg/dL Final     Hemoglobin 05/21/2019 12.9  11.7 - 15.7 g/dL Final     Hematocrit 05/21/2019 39.3  35.0 - 47.0 % Final     CK Total 05/21/2019 104  30 - 225 U/L Final     Hemoglobin 05/22/2019 11.6* 11.7 - 15.7 g/dL Final     Hematocrit 05/22/2019 36.5  35.0 - 47.0 % Final     Urea Nitrogen 05/22/2019 17  7 - 30 mg/dL Final     Creatinine 05/22/2019 1.14* 0.52 - 1.04 mg/dL Final     GFR Estimate 05/22/2019 67  >60 mL/min/[1.73_m2] Final    Comment: Non  GFR Calc  Starting 12/18/2018, serum creatinine based estimated GFR (eGFR) will be   calculated using the Chronic Kidney Disease Epidemiology Collaboration   (CKD-EPI) equation.       GFR Estimate If Black 05/22/2019 77  >60 mL/min/[1.73_m2] Final    Comment:  GFR Calc  Starting 12/18/2018, serum creatinine based estimated GFR (eGFR) will be   calculated using the Chronic Kidney Disease Epidemiology Collaboration   (CKD-EPI) equation.       CK Total 05/22/2019 165  30 - 225 U/L Final     Glucose 05/22/2019 97  70 - 99 mg/dL Final     Platelet Count 05/22/2019 397  150 - 450 10e9/L Final     CK Total 05/23/2019 187  30 - 225 U/L Final       Britney Dial MD

## 2019-06-11 NOTE — PROGRESS NOTES
Transplant Surgery Kidney Donor Progress Note     Medical record number: [id number]  YOB: 1995,   Date of Visit:  06/11/2019  For followup after kidney donation.    Assessment and Recommendations: Ms. Crain is doing well after kidney donation.     1. Lifting restrictions: 10 lbs until 6 weeks post donation.  2. Followup: 6 weeks, 1 yr and 2 yr  3. Return to work to be determined per patient's progress, expected between 6-8 weeks after surgery.  4.  Pt would like to know if she can restart her Topamax for her migraines.  She was told to stop it prior donation and the Cymbalta she's on is not as effective.  I will check with the pharmacist and get back to her.  5.  UA with micro and culture due to pressure she's feeling with urination.    Total time: 15 minutes  Counselling time: 10 minutes      Britney Dial MD FACS  Assistant Professor of Surgery  Director, Living Kidney Donor Program.    ------------------------------------------------------------------------------------------    S: Ms. Crain donate a kidney 2 weeks ago and is doing well, reporting no fevers, chills, dysuria.  She is not taking narcotic analgesia.  She is not constipated.  She is mostly back to routine activities of daily living and is not feeling ready to return to work at this time.  She does feel some pressure and pain with urination but not burning like she has had in the past with UTI's.    Medications:  Prescription Medications as of 6/11/2019       Rx Number Disp Refills Start End Last Dispensed Date Next Fill Date Owning Pharmacy    cetirizine (ZYRTEC) 10 MG tablet            Sig: Take 10 mg by mouth daily    Class: Historical    Route: Oral    DULoxetine (CYMBALTA) 20 MG capsule            Sig: Take 20 mg by mouth daily     Class: Historical    Route: Oral    olopatadine (PATADAY) 0.2 % ophthalmic solution            Sig: Place 1 drop into both eyes every 6 hours as needed (allergy)     Class: Historical     Route: Both Eyes    omeprazole (PRILOSEC) 20 MG CR capsule    6/22/2018        Sig: Take 20 mg by mouth daily    Class: Historical    Route: Oral    ondansetron (ZOFRAN-ODT) 4 MG ODT tab  20 tablet 0 5/24/2019    [facility] [address]    Sig: Take 1 tablet (4 mg) by mouth every 6 hours as needed for nausea or vomiting    Class: E-Prescribe    Route: Oral    oxyCODONE (ROXICODONE) 5 MG tablet  20 tablet 0 5/24/2019    [address]    Sig: Take 1-2 tablets (5-10 mg) by mouth every 4 hours as needed for severe pain (Start with 5mg dose)    Class: Local Print    Earliest Fill Date: 5/24/2019    Route: Oral    polyethylene glycol (MIRALAX/GLYCOLAX) packet  20 packet 0 5/25/2019    [address]    Sig: Take 17 g by mouth daily    Class: E-Prescribe    Route: Oral    promethazine (PHENERGAN) 25 MG tablet            Sig: Take 25 mg by mouth every 6 hours as needed for nausea (At start of migraine)    Class: Historical    Route: Oral    rizatriptan (MAXALT-MLT) 10 MG ODT            Sig: Take 10 mg by mouth at onset of headache for migraine    Class: Historical    Route: Oral    vitamin D3 (CHOLECALCIFEROL) 2000 units (50 mcg) tablet            Sig: Take 1 tablet by mouth daily    Class: Historical    Route: Oral          Exam:   Pulse:  [98] 98  BP: (120)/(86) 120/86  SpO2:  [99 %] 99 %  Appearance: in no apparent distress.   Skin: normal  Head and Neck: Normal, no rashes or jaundice  Respiratory: normal respiratory excursions, no audible wheeze  Cardiovascular: RRR  Abdomen: flat, No distention, incisions C/D/I   Extremeties: Edema, none  Neuro: without deficit       Labs:   Admission on 05/21/2019, Discharged on 05/24/2019   Component Date Value Ref Range Status     Glucose 05/21/2019 105* 70 - 99 mg/dL Final     Hemoglobin 05/21/2019 12.9  11.7 - 15.7 g/dL  Final     Hematocrit 05/21/2019 39.3  35.0 - 47.0 % Final     CK Total 05/21/2019 104  30 - 225 U/L Final     Hemoglobin 05/22/2019 11.6* 11.7 - 15.7 g/dL Final     Hematocrit 05/22/2019 36.5  35.0 - 47.0 % Final     Urea Nitrogen 05/22/2019 17  7 - 30 mg/dL Final     Creatinine 05/22/2019 1.14* 0.52 - 1.04 mg/dL Final     GFR Estimate 05/22/2019 67  >60 mL/min/[1.73_m2] Final    Comment: Non  GFR Calc  Starting 12/18/2018, serum creatinine based estimated GFR (eGFR) will be   calculated using the Chronic Kidney Disease Epidemiology Collaboration   (CKD-EPI) equation.       GFR Estimate If Black 05/22/2019 77  >60 mL/min/[1.73_m2] Final    Comment:  GFR Calc  Starting 12/18/2018, serum creatinine based estimated GFR (eGFR) will be   calculated using the Chronic Kidney Disease Epidemiology Collaboration   (CKD-EPI) equation.       CK Total 05/22/2019 165  30 - 225 U/L Final     Glucose 05/22/2019 97  70 - 99 mg/dL Final     Platelet Count 05/22/2019 397  150 - 450 10e9/L Final     CK Total 05/23/2019 187  30 - 225 U/L Final

## 2019-06-11 NOTE — PROGRESS NOTES
Chief Complaint   Patient presents with     RECHECK     2-3 week post donor     Blood pressure 120/86, pulse 98, weight 83.6 kg (184 lb 3.2 oz), SpO2 99 %.    Jacy Gilliam, CMA

## 2019-06-18 ENCOUNTER — TELEPHONE (OUTPATIENT)
Dept: TRANSPLANT | Facility: CLINIC | Age: 24
End: 2019-06-18

## 2019-06-18 NOTE — TELEPHONE ENCOUNTER
I sent the following email to the patient secured.    Hi Kinjal, I got an email from the surgeon regarding your medication, you will need to check with your Primary care physician.  Please read:  I saw Kinjal Crain a couple of days ago and she asked about restarting her topamax.  I talked with Dr Cardona and he said he thought it was fine as long as her PCP monitored her urine a couple of times a year (urinary pH and if off citrate levels).  She wanted to restart it because the Cymbalta was not working as well.  Can you let her know it's ok to restart if her PCP is ok with it?  Thanks,  Britney Dial MD FACS    Malu Lee RN (Mandy), BA -

## 2019-06-27 ENCOUNTER — TELEPHONE (OUTPATIENT)
Dept: TRANSPLANT | Facility: CLINIC | Age: 24
End: 2019-06-27

## 2019-06-27 NOTE — TELEPHONE ENCOUNTER
Called Kinjal again regarding her request of a EZE extension due to slow recovery.  We reviewed her concerns: fatigue, discomfort, and long commute to work.  I told her that Dr. Dial would extend her leave to a total of 8 weeks with a return to work date of July 16th.  If Kinjal needs additional time off we would like her to come to see us [facility] to further assess her symptoms/cocerns.  Kinjal verbalized understanding of that plan and was relieved for the additional time to recover.  She will have her work fax us forms to fill out and we will fill them out when received.    Arleen Valdivia RN, BSN, CCTN  Living Donor Coordinator  [phone]

## 2019-06-27 NOTE — TELEPHONE ENCOUNTER
Kinjal emailed her coordinator Malu Lee (who is out of office at a conference).  I called Kinjal to follow-up with her request to talk to Dr. Dial re: EZE extension.  I wanted to assess how she is recovering.  I left a VM asking Kinjal to call me back, and I will try to call her again later this afternoon.      Arleen Valdivia RN, BSN, CCTN  Living Donor Coordinator  [phone]

## 2019-08-29 ENCOUNTER — TELEPHONE (OUTPATIENT)
Dept: TRANSPLANT | Facility: CLINIC | Age: 24
End: 2019-08-29

## 2019-08-29 NOTE — TELEPHONE ENCOUNTER
"I called the patient to check on her recovery since her donation.  She reports doing very well as she states she feels \"normal.\"  She was able to switch her medication to topamax with her primary care and is aware that they will need to check her labs once in awhile.    She did not have any questions at this time.  I reviewed the next labs due will be in November.    "

## 2020-03-11 ENCOUNTER — HEALTH MAINTENANCE LETTER (OUTPATIENT)
Age: 25
End: 2020-03-11

## 2021-01-03 ENCOUNTER — HEALTH MAINTENANCE LETTER (OUTPATIENT)
Age: 26
End: 2021-01-03

## 2021-04-25 ENCOUNTER — HEALTH MAINTENANCE LETTER (OUTPATIENT)
Age: 26
End: 2021-04-25

## 2021-10-10 ENCOUNTER — HEALTH MAINTENANCE LETTER (OUTPATIENT)
Age: 26
End: 2021-10-10

## 2022-05-21 ENCOUNTER — HEALTH MAINTENANCE LETTER (OUTPATIENT)
Age: 27
End: 2022-05-21

## 2022-09-18 ENCOUNTER — HEALTH MAINTENANCE LETTER (OUTPATIENT)
Age: 27
End: 2022-09-18

## 2023-03-30 NOTE — TELEPHONE ENCOUNTER
Called Kinjal to talk about results of her eval.   Soolantra Pregnancy And Lactation Text: This medication is Pregnancy Category C. This medication is considered safe during breast feeding.

## 2023-06-04 ENCOUNTER — HEALTH MAINTENANCE LETTER (OUTPATIENT)
Age: 28
End: 2023-06-04

## (undated) DEVICE — SU SILK 4-0 TIE 12X30" A303H

## (undated) DEVICE — TUBING INSUFFLATION W/FILTER CPC TO LUER 620-030-301

## (undated) DEVICE — DRAPE SLUSH/WARMER 66X44" ORS-320

## (undated) DEVICE — Device

## (undated) DEVICE — SU VICRYL 3-0 SH 27" J316H

## (undated) DEVICE — SUCTION MANIFOLD DORNOCH ULTRA CART UL-CL500

## (undated) DEVICE — DRAPE IOBAN INCISE 23X17" 6650EZ

## (undated) DEVICE — ANTIFOG SOLUTION W/FOAM PAD 31142527

## (undated) DEVICE — SU SILK 2-0 TIE 12X30" A305H

## (undated) DEVICE — LINEN TOWEL PACK X5 5464

## (undated) DEVICE — SOL WATER IRRIG 1000ML BOTTLE 2F7114

## (undated) DEVICE — TUBING IRRIG CYSTO/BLADDER SET 81" LF 2C4040

## (undated) DEVICE — ENDO TROCAR FIRST ENTRY KII FIOS Z-THRD 12X100MM CTF73

## (undated) DEVICE — CANNULA VESSEL DLP  30001

## (undated) DEVICE — BASIN SET SINGLE STERILE 13752-624

## (undated) DEVICE — CATH FOLEY 18FR 5ML SILICONE LUBRI-SIL 175818

## (undated) DEVICE — SOL NACL 0.9% INJ 1000ML BAG 2B1324X

## (undated) DEVICE — SU VICRYL 0 TIE 54" J608H

## (undated) DEVICE — STPL ENDO TA30 2.5MM MULTIFIRE 010901

## (undated) DEVICE — ADH SKIN CLOSURE PREMIERPRO EXOFIN 1.0ML 3470

## (undated) DEVICE — SU SILK 3-0 TIE 12X30" A304H

## (undated) DEVICE — SUCTION IRR STRYKERFLOW II W/TIP 250-070-520

## (undated) DEVICE — ESU HARMONIC LAPAROSCOPIC SHEARS HD 1000I 36CM HARHD36

## (undated) DEVICE — ENDO TROCAR FIRST ENTRY KII FIOS Z-THRD 05X100MM CTF03

## (undated) DEVICE — SPONGE LAP 12X12" X8425

## (undated) DEVICE — DRAPE ISOLATION BAG 1003

## (undated) DEVICE — ENDO SCOPE WARMER SEAL  C3101

## (undated) DEVICE — PREP CHLORAPREP 26ML TINTED ORANGE  260815

## (undated) DEVICE — SU PDS II 0 TP-1 60" Z991G

## (undated) DEVICE — CLIP APPLIER ENDO ROTATING 10MM MED/LG ER320

## (undated) DEVICE — SU MONOCRYL 4-0 PS-2 27" UND Y426H

## (undated) DEVICE — ESU ENDO SCISSORS 5MM CVD 5DCS

## (undated) DEVICE — ENDO CLOSING KIT ENDOCLOSE 173022

## (undated) DEVICE — CATH FOLEY 14FR 5ML SILICONE LUBRI-SIL 175814

## (undated) DEVICE — STPL ENDO RELOAD TA 30X2.5MM 010911L

## (undated) DEVICE — LINEN TOWEL PACK X6 WHITE 5487

## (undated) DEVICE — SU VICRYL 3-0 SH 27" UND J416H

## (undated) DEVICE — ENDO TROCAR SLEEVE KII Z-THREADED 12X100MM CTS22

## (undated) DEVICE — ESU HOLSTER PLASTIC DISP E2400

## (undated) RX ORDER — ACETAMINOPHEN 325 MG/1
TABLET ORAL
Status: DISPENSED
Start: 2019-05-21

## (undated) RX ORDER — HEPARIN SODIUM 5000 [USP'U]/.5ML
INJECTION, SOLUTION INTRAVENOUS; SUBCUTANEOUS
Status: DISPENSED
Start: 2019-05-21

## (undated) RX ORDER — PROTAMINE SULFATE 10 MG/ML
INJECTION, SOLUTION INTRAVENOUS
Status: DISPENSED
Start: 2019-05-21

## (undated) RX ORDER — DEXTROSE, SODIUM CHLORIDE, SODIUM LACTATE, POTASSIUM CHLORIDE, AND CALCIUM CHLORIDE 5; .6; .31; .03; .02 G/100ML; G/100ML; G/100ML; G/100ML; G/100ML
INJECTION, SOLUTION INTRAVENOUS
Status: DISPENSED
Start: 2019-05-21

## (undated) RX ORDER — FENTANYL CITRATE 50 UG/ML
INJECTION, SOLUTION INTRAMUSCULAR; INTRAVENOUS
Status: DISPENSED
Start: 2019-05-21

## (undated) RX ORDER — HEPARIN SODIUM 1000 [USP'U]/ML
INJECTION, SOLUTION INTRAVENOUS; SUBCUTANEOUS
Status: DISPENSED
Start: 2019-05-21

## (undated) RX ORDER — ONDANSETRON 2 MG/ML
INJECTION INTRAMUSCULAR; INTRAVENOUS
Status: DISPENSED
Start: 2019-05-21

## (undated) RX ORDER — SODIUM CHLORIDE, SODIUM LACTATE, POTASSIUM CHLORIDE, CALCIUM CHLORIDE 600; 310; 30; 20 MG/100ML; MG/100ML; MG/100ML; MG/100ML
INJECTION, SOLUTION INTRAVENOUS
Status: DISPENSED
Start: 2019-05-21

## (undated) RX ORDER — FUROSEMIDE 10 MG/ML
INJECTION INTRAMUSCULAR; INTRAVENOUS
Status: DISPENSED
Start: 2019-05-21

## (undated) RX ORDER — PROPOFOL 10 MG/ML
INJECTION, EMULSION INTRAVENOUS
Status: DISPENSED
Start: 2019-05-21

## (undated) RX ORDER — HYDROMORPHONE HYDROCHLORIDE 1 MG/ML
INJECTION, SOLUTION INTRAMUSCULAR; INTRAVENOUS; SUBCUTANEOUS
Status: DISPENSED
Start: 2019-05-21

## (undated) RX ORDER — CEFUROXIME SODIUM 1.5 G/16ML
INJECTION, POWDER, FOR SOLUTION INTRAVENOUS
Status: DISPENSED
Start: 2019-05-21

## (undated) RX ORDER — MANNITOL 20 G/100ML
INJECTION, SOLUTION INTRAVENOUS
Status: DISPENSED
Start: 2019-05-21

## (undated) RX ORDER — SCOLOPAMINE TRANSDERMAL SYSTEM 1 MG/1
PATCH, EXTENDED RELEASE TRANSDERMAL
Status: DISPENSED
Start: 2019-05-21

## (undated) RX ORDER — KETOROLAC TROMETHAMINE 30 MG/ML
INJECTION, SOLUTION INTRAMUSCULAR; INTRAVENOUS
Status: DISPENSED
Start: 2019-05-21

## (undated) RX ORDER — LIDOCAINE HYDROCHLORIDE 20 MG/ML
INJECTION, SOLUTION EPIDURAL; INFILTRATION; INTRACAUDAL; PERINEURAL
Status: DISPENSED
Start: 2019-05-21

## (undated) RX ORDER — BUPIVACAINE HYDROCHLORIDE 5 MG/ML
INJECTION, SOLUTION EPIDURAL; INTRACAUDAL
Status: DISPENSED
Start: 2019-05-21

## (undated) RX ORDER — PHENYLEPHRINE HCL IN 0.9% NACL 1 MG/10 ML
SYRINGE (ML) INTRAVENOUS
Status: DISPENSED
Start: 2019-05-21

## (undated) RX ORDER — GABAPENTIN 300 MG/1
CAPSULE ORAL
Status: DISPENSED
Start: 2019-05-21

## (undated) RX ORDER — DEXAMETHASONE SODIUM PHOSPHATE 4 MG/ML
INJECTION, SOLUTION INTRA-ARTICULAR; INTRALESIONAL; INTRAMUSCULAR; INTRAVENOUS; SOFT TISSUE
Status: DISPENSED
Start: 2019-05-21

## (undated) RX ORDER — CARDIOPLEG/ORGAN PRESERV NO.1 9-198-2-1
BOTTLE PERFUSION
Status: DISPENSED
Start: 2019-05-21

## (undated) RX ORDER — LIDOCAINE HYDROCHLORIDE 10 MG/ML
INJECTION, SOLUTION EPIDURAL; INFILTRATION; INTRACAUDAL; PERINEURAL
Status: DISPENSED
Start: 2019-05-21